# Patient Record
Sex: FEMALE | Race: WHITE | NOT HISPANIC OR LATINO | Employment: FULL TIME | URBAN - METROPOLITAN AREA
[De-identification: names, ages, dates, MRNs, and addresses within clinical notes are randomized per-mention and may not be internally consistent; named-entity substitution may affect disease eponyms.]

---

## 2017-12-06 DIAGNOSIS — M81.0 OSTEOPOROSIS: ICD-10-CM

## 2017-12-06 RX ORDER — RALOXIFENE HYDROCHLORIDE 60 MG/1
60 TABLET, FILM COATED ORAL DAILY
Qty: 90 TABLET | Refills: 3 | OUTPATIENT
Start: 2017-12-06 | End: 2018-12-06

## 2017-12-08 ENCOUNTER — TELEPHONE (OUTPATIENT)
Dept: OBSTETRICS AND GYNECOLOGY | Facility: CLINIC | Age: 52
End: 2017-12-08

## 2017-12-08 NOTE — TELEPHONE ENCOUNTER
Dr. Singh patient calling abut her rx- her pharmacy told her to call our office and she doesn't know why.

## 2018-01-08 ENCOUNTER — OFFICE VISIT (OUTPATIENT)
Dept: OBSTETRICS AND GYNECOLOGY | Facility: CLINIC | Age: 53
End: 2018-01-08
Attending: OBSTETRICS & GYNECOLOGY
Payer: COMMERCIAL

## 2018-01-08 VITALS
DIASTOLIC BLOOD PRESSURE: 62 MMHG | WEIGHT: 130.63 LBS | HEIGHT: 67 IN | BODY MASS INDEX: 20.5 KG/M2 | SYSTOLIC BLOOD PRESSURE: 100 MMHG

## 2018-01-08 DIAGNOSIS — Z11.51 ENCOUNTER FOR SCREENING FOR HUMAN PAPILLOMAVIRUS (HPV): ICD-10-CM

## 2018-01-08 DIAGNOSIS — Z12.4 ENCOUNTER FOR SCREENING FOR CERVICAL CANCER: ICD-10-CM

## 2018-01-08 DIAGNOSIS — Z01.419 ENCOUNTER FOR GYNECOLOGICAL EXAMINATION: Primary | ICD-10-CM

## 2018-01-08 PROCEDURE — 87624 HPV HI-RISK TYP POOLED RSLT: CPT

## 2018-01-08 PROCEDURE — 99386 PREV VISIT NEW AGE 40-64: CPT | Mod: S$PBB,,, | Performed by: OBSTETRICS & GYNECOLOGY

## 2018-01-08 PROCEDURE — 99999 PR PBB SHADOW E&M-EST. PATIENT-LVL III: CPT | Mod: PBBFAC,,, | Performed by: OBSTETRICS & GYNECOLOGY

## 2018-01-08 PROCEDURE — 88175 CYTOPATH C/V AUTO FLUID REDO: CPT

## 2018-01-08 RX ORDER — RALOXIFENE HYDROCHLORIDE 60 MG/1
TABLET, FILM COATED ORAL
COMMUNITY
Start: 2016-01-12 | End: 2018-01-08

## 2018-01-08 RX ORDER — RALOXIFENE HYDROCHLORIDE 60 MG/1
60 TABLET, FILM COATED ORAL DAILY
Qty: 90 TABLET | Refills: 3 | Status: SHIPPED | OUTPATIENT
Start: 2018-01-08 | End: 2019-01-05 | Stop reason: SDUPTHER

## 2018-01-08 NOTE — PROGRESS NOTES
Subjective:       Patient ID: Radha Mario is a 52 y.o. female.    Chief Complaint:  Annual Exam (last annual 2015, normal Pap, mmg 2017 at )      History of Present Illness  - here for annual. No complaints.    Past Medical History:   Diagnosis Date    Anxiety     Breast cancer        Past Surgical History:   Procedure Laterality Date    breast augmentation      BREAST LUMPECTOMY      then radiation         Current Outpatient Prescriptions:     RALOXIFENE HCL (EVISTA ORAL), once a day, Disp: , Rfl:     raloxifene (EVISTA) 60 mg tablet, Take 1 tablet (60 mg total) by mouth once daily., Disp: 90 tablet, Rfl: 3    Review of patient's allergies indicates:  No Known Allergies    GYN & OB History  No LMP recorded. Patient is postmenopausal.   Date of Last Pap: No result found    OB History    Para Term  AB Living   2 2 2     1   SAB TAB Ectopic Multiple Live Births           1      # Outcome Date GA Lbr Warren/2nd Weight Sex Delivery Anes PTL Lv   2 Term  40w0d  3.232 kg (7 lb 2 oz) F Vag-Spont EPI  MAAME   1 Term  40w0d  3.118 kg (6 lb 14 oz) F Vag-Spont EPI            Social History     Social History    Marital status:      Spouse name: N/A    Number of children: N/A    Years of education: N/A     Occupational History    Not on file.     Social History Main Topics    Smoking status: Current Every Day Smoker    Smokeless tobacco: Never Used    Alcohol use Yes      Comment: socailly     Drug use: No    Sexual activity: Yes     Partners: Male     Birth control/ protection: None     Other Topics Concern    Not on file     Social History Narrative    No narrative on file       Family History   Problem Relation Age of Onset    Stroke Father     Hypertension Mother     Breast cancer Neg Hx     Colon cancer Neg Hx     Diabetes Neg Hx     Ovarian cancer Neg Hx        Review of Systems  Review of Systems   Respiratory: Negative for shortness of breath.    Cardiovascular:  "Negative for chest pain and palpitations.   Gastrointestinal: Negative for blood in stool, nausea and vomiting.   Genitourinary:        - see HPI   Skin: Negative for rash and wound.   Allergic/Immunologic: Negative for immunocompromised state.   Neurological: Negative for dizziness and syncope.   Hematological: Negative for adenopathy.   Psychiatric/Behavioral: Negative for behavioral problems.        Objective:     Vitals:    01/08/18 1040   BP: 100/62   Weight: 59.2 kg (130 lb 10 oz)   Height: 5' 7" (1.702 m)       Physical Exam:   Constitutional: She is oriented to person, place, and time. She appears well-developed and well-nourished.        Pulmonary/Chest: Right breast exhibits no mass, no nipple discharge, no skin change, no tenderness and no swelling. Left breast exhibits no mass, no nipple discharge, no skin change, no tenderness and no swelling. Breasts are symmetrical.   Bilateral implants        Abdominal: Soft. She exhibits no distension. There is no tenderness.     Genitourinary: Vagina normal and uterus normal. There is no tenderness or lesion on the right labia. There is no tenderness or lesion on the left labia. Cervix is normal. Right adnexum displays no mass, no tenderness and no fullness. Left adnexum displays no mass, no tenderness and no fullness. No vaginal discharge found. Additional cervical findings: pap smear done          Musculoskeletal: Moves all extremeties.       Neurological: She is alert and oriented to person, place, and time.     Psychiatric: She has a normal mood and affect.        Assessment/ Plan:     Orders Placed This Encounter    HPV High Risk Genotypes, PCR    Liquid-based pap smear, screening    raloxifene (EVISTA) 60 mg tablet       Radha CRANDALL was seen today for annual exam.    Diagnoses and all orders for this visit:    Encounter for gynecological examination    Encounter for screening for human papillomavirus (HPV)  -     HPV High Risk Genotypes, PCR    Encounter for " screening for cervical cancer   -     Liquid-based pap smear, screening    Other orders  -     raloxifene (EVISTA) 60 mg tablet; Take 1 tablet (60 mg total) by mouth once daily.        Return in about 1 year (around 1/8/2019) for annual exam.

## 2018-01-12 ENCOUNTER — PATIENT MESSAGE (OUTPATIENT)
Dept: OBSTETRICS AND GYNECOLOGY | Facility: CLINIC | Age: 53
End: 2018-01-12

## 2018-01-12 LAB
HPV16 AG SPEC QL: NEGATIVE
HPV16+18+H RISK 12 DNA CVX-IMP: POSITIVE
HPV18 DNA SPEC QL NAA+PROBE: NEGATIVE

## 2019-01-07 RX ORDER — RALOXIFENE HYDROCHLORIDE 60 MG/1
TABLET, FILM COATED ORAL
Qty: 90 TABLET | Refills: 0 | Status: SHIPPED | OUTPATIENT
Start: 2019-01-07 | End: 2019-03-18 | Stop reason: SDUPTHER

## 2019-03-18 RX ORDER — RALOXIFENE HYDROCHLORIDE 60 MG/1
TABLET, FILM COATED ORAL
Qty: 90 TABLET | Refills: 0 | Status: SHIPPED | OUTPATIENT
Start: 2019-03-18 | End: 2019-04-26 | Stop reason: SDUPTHER

## 2019-04-26 ENCOUNTER — OFFICE VISIT (OUTPATIENT)
Dept: OBSTETRICS AND GYNECOLOGY | Facility: CLINIC | Age: 54
End: 2019-04-26
Payer: COMMERCIAL

## 2019-04-26 VITALS
DIASTOLIC BLOOD PRESSURE: 68 MMHG | HEIGHT: 67 IN | WEIGHT: 133.19 LBS | SYSTOLIC BLOOD PRESSURE: 104 MMHG | BODY MASS INDEX: 20.9 KG/M2

## 2019-04-26 DIAGNOSIS — M81.0 OSTEOPOROSIS OF LUMBAR SPINE: ICD-10-CM

## 2019-04-26 DIAGNOSIS — Z01.419 ENCOUNTER FOR GYNECOLOGICAL EXAMINATION: Primary | ICD-10-CM

## 2019-04-26 PROCEDURE — 99999 PR PBB SHADOW E&M-EST. PATIENT-LVL III: ICD-10-PCS | Mod: PBBFAC,,, | Performed by: OBSTETRICS & GYNECOLOGY

## 2019-04-26 PROCEDURE — 99396 PREV VISIT EST AGE 40-64: CPT | Mod: S$GLB,,, | Performed by: OBSTETRICS & GYNECOLOGY

## 2019-04-26 PROCEDURE — 99396 PR PREVENTIVE VISIT,EST,40-64: ICD-10-PCS | Mod: S$GLB,,, | Performed by: OBSTETRICS & GYNECOLOGY

## 2019-04-26 PROCEDURE — 99999 PR PBB SHADOW E&M-EST. PATIENT-LVL III: CPT | Mod: PBBFAC,,, | Performed by: OBSTETRICS & GYNECOLOGY

## 2019-04-26 RX ORDER — VALACYCLOVIR HYDROCHLORIDE 500 MG/1
500 TABLET, FILM COATED ORAL 2 TIMES DAILY
Qty: 30 TABLET | Refills: 11 | Status: SHIPPED | OUTPATIENT
Start: 2019-04-26 | End: 2021-05-13 | Stop reason: SDUPTHER

## 2019-04-26 RX ORDER — RALOXIFENE HYDROCHLORIDE 60 MG/1
60 TABLET, FILM COATED ORAL DAILY
Qty: 90 TABLET | Refills: 3 | Status: SHIPPED | OUTPATIENT
Start: 2019-04-26 | End: 2020-06-15 | Stop reason: SDUPTHER

## 2019-04-26 NOTE — PROGRESS NOTES
Subjective:       Patient ID: Radha Mario is a 53 y.o. female.    Chief Complaint:  Well Woman (pap nl/hpv- 2018 mammo followed by Dr Ritter )      History of Present Illness  - here for annual. No complaints. Hasn't had BMD since . Taking Evista with no problems.  - has a fever blisters; requests Valtrex.    Past Medical History:   Diagnosis Date    Anxiety     Breast cancer        Past Surgical History:   Procedure Laterality Date    breast augmentation      BREAST LUMPECTOMY      then radiation         Current Outpatient Medications:     raloxifene (EVISTA) 60 mg tablet, Take 1 tablet (60 mg total) by mouth once daily., Disp: 90 tablet, Rfl: 3    valACYclovir (VALTREX) 500 MG tablet, Take 1 tablet (500 mg total) by mouth 2 (two) times daily. For 3 days as needed for cold sores., Disp: 30 tablet, Rfl: 11    Review of patient's allergies indicates:  No Known Allergies    GYN & OB History  No LMP recorded. Patient is postmenopausal.   Date of Last Pap: 2018    OB History    Para Term  AB Living   2 2 2     1   SAB TAB Ectopic Multiple Live Births           1      # Outcome Date GA Lbr Warren/2nd Weight Sex Delivery Anes PTL Lv   2 Term  40w0d  3.232 kg (7 lb 2 oz) F Vag-Spont EPI  MAAME   1 Term  40w0d  3.118 kg (6 lb 14 oz) F Vag-Spont EPI         Social History     Socioeconomic History    Marital status:      Spouse name: Not on file    Number of children: Not on file    Years of education: Not on file    Highest education level: Not on file   Occupational History    Not on file   Social Needs    Financial resource strain: Not on file    Food insecurity:     Worry: Not on file     Inability: Not on file    Transportation needs:     Medical: Not on file     Non-medical: Not on file   Tobacco Use    Smoking status: Current Every Day Smoker    Smokeless tobacco: Never Used   Substance and Sexual Activity    Alcohol use: Yes     Comment: socailly     Drug use:  "No    Sexual activity: Yes     Partners: Male     Birth control/protection: None, Post-menopausal   Lifestyle    Physical activity:     Days per week: Not on file     Minutes per session: Not on file    Stress: Not on file   Relationships    Social connections:     Talks on phone: Not on file     Gets together: Not on file     Attends Sikhism service: Not on file     Active member of club or organization: Not on file     Attends meetings of clubs or organizations: Not on file     Relationship status: Not on file   Other Topics Concern    Not on file   Social History Narrative    Not on file       Family History   Problem Relation Age of Onset    Stroke Father     Hypertension Mother     Breast cancer Neg Hx     Colon cancer Neg Hx     Diabetes Neg Hx     Ovarian cancer Neg Hx        Review of Systems  Review of Systems   Respiratory: Negative for shortness of breath.    Cardiovascular: Negative for chest pain and palpitations.   Gastrointestinal: Negative for blood in stool, nausea and vomiting.   Genitourinary:        - see HPI   Skin: Negative for rash and wound.   Allergic/Immunologic: Negative for immunocompromised state.   Neurological: Negative for dizziness and syncope.   Hematological: Negative for adenopathy.   Psychiatric/Behavioral: Negative for behavioral problems.        Objective:     Vitals:    04/26/19 1127   BP: 104/68   Weight: 60.4 kg (133 lb 2.5 oz)   Height: 5' 7" (1.702 m)       Physical Exam:   Constitutional: She is oriented to person, place, and time. She appears well-developed and well-nourished.        Pulmonary/Chest: Right breast exhibits no mass, no nipple discharge, no skin change, no tenderness and no swelling. Left breast exhibits no mass, no nipple discharge, no skin change, no tenderness and no swelling. Breasts are symmetrical.   Bilateral implants        Abdominal: Soft. She exhibits no distension. There is no tenderness.     Genitourinary: Vagina normal and uterus " normal. There is no tenderness or lesion on the right labia. There is no tenderness or lesion on the left labia. Cervix is normal. Right adnexum displays no mass, no tenderness and no fullness. Left adnexum displays no mass, no tenderness and no fullness. No vaginal discharge found.           Musculoskeletal: Moves all extremeties.       Neurological: She is alert and oriented to person, place, and time.     Psychiatric: She has a normal mood and affect.        Assessment/ Plan:     Orders Placed This Encounter    DXA Bone Density Spine And Hip    valACYclovir (VALTREX) 500 MG tablet    raloxifene (EVISTA) 60 mg tablet       Radha CRANDALL was seen today for well woman.    Diagnoses and all orders for this visit:    Encounter for gynecological examination    Osteoporosis of lumbar spine  -     DXA Bone Density Spine And Hip; Future    Other orders  -     valACYclovir (VALTREX) 500 MG tablet; Take 1 tablet (500 mg total) by mouth 2 (two) times daily. For 3 days as needed for cold sores.  -     raloxifene (EVISTA) 60 mg tablet; Take 1 tablet (60 mg total) by mouth once daily.    - needs a PCP. Discussed her calling to make an appt with Dr. Luna.    Follow up in about 1 year (around 4/26/2020) for annual exam.

## 2019-05-20 ENCOUNTER — APPOINTMENT (OUTPATIENT)
Dept: RADIOLOGY | Facility: CLINIC | Age: 54
End: 2019-05-20
Attending: OBSTETRICS & GYNECOLOGY
Payer: COMMERCIAL

## 2019-05-20 DIAGNOSIS — M81.0 OSTEOPOROSIS OF LUMBAR SPINE: ICD-10-CM

## 2019-05-20 PROCEDURE — 77080 DXA BONE DENSITY AXIAL: CPT | Mod: TC,PO

## 2019-05-20 PROCEDURE — 77080 DXA BONE DENSITY AXIAL: CPT | Mod: 26,,, | Performed by: INTERNAL MEDICINE

## 2019-05-20 PROCEDURE — 77080 DEXA BONE DENSITY SPINE HIP: ICD-10-PCS | Mod: 26,,, | Performed by: INTERNAL MEDICINE

## 2019-05-23 ENCOUNTER — PATIENT MESSAGE (OUTPATIENT)
Dept: OBSTETRICS AND GYNECOLOGY | Facility: CLINIC | Age: 54
End: 2019-05-23

## 2020-06-15 RX ORDER — RALOXIFENE HYDROCHLORIDE 60 MG/1
60 TABLET, FILM COATED ORAL DAILY
Qty: 90 TABLET | Refills: 0 | Status: SHIPPED | OUTPATIENT
Start: 2020-06-15 | End: 2020-09-08

## 2020-06-15 NOTE — TELEPHONE ENCOUNTER
Sybil Vaz S Staff 45 minutes ago (8:54 AM)     Pharm faxed request    Routing comment       [id number] - [place] - [address] [place] [phone]  Sybil Monique 46 minutes ago (8:53 AM)     Pt last annual was 4/26/19 with no current appt scheduled please review

## 2020-09-08 ENCOUNTER — OFFICE VISIT (OUTPATIENT)
Dept: OBSTETRICS AND GYNECOLOGY | Facility: CLINIC | Age: 55
End: 2020-09-08
Payer: COMMERCIAL

## 2020-09-08 VITALS — BODY MASS INDEX: 20.86 KG/M2 | HEIGHT: 67 IN

## 2020-09-08 DIAGNOSIS — Z01.419 WOMEN'S ANNUAL ROUTINE GYNECOLOGICAL EXAMINATION: Primary | ICD-10-CM

## 2020-09-08 DIAGNOSIS — Z76.89 ENCOUNTER TO ESTABLISH CARE: ICD-10-CM

## 2020-09-08 DIAGNOSIS — M81.0 OSTEOPOROSIS OF LUMBAR SPINE: ICD-10-CM

## 2020-09-08 DIAGNOSIS — F17.200 NICOTINE DEPENDENCE WITH CURRENT USE: ICD-10-CM

## 2020-09-08 DIAGNOSIS — Z12.4 PAP SMEAR FOR CERVICAL CANCER SCREENING: ICD-10-CM

## 2020-09-08 PROCEDURE — 88141 PR  CYTOPATH CERV/VAG INTERPRET: ICD-10-PCS | Mod: ,,, | Performed by: PATHOLOGY

## 2020-09-08 PROCEDURE — 99396 PREV VISIT EST AGE 40-64: CPT | Mod: S$GLB,,, | Performed by: NURSE PRACTITIONER

## 2020-09-08 PROCEDURE — 99999 PR PBB SHADOW E&M-EST. PATIENT-LVL III: ICD-10-PCS | Mod: PBBFAC,,, | Performed by: NURSE PRACTITIONER

## 2020-09-08 PROCEDURE — 3008F PR BODY MASS INDEX (BMI) DOCUMENTED: ICD-10-PCS | Mod: CPTII,S$GLB,, | Performed by: NURSE PRACTITIONER

## 2020-09-08 PROCEDURE — 3008F BODY MASS INDEX DOCD: CPT | Mod: CPTII,S$GLB,, | Performed by: NURSE PRACTITIONER

## 2020-09-08 PROCEDURE — 87624 HPV HI-RISK TYP POOLED RSLT: CPT

## 2020-09-08 PROCEDURE — 99999 PR PBB SHADOW E&M-EST. PATIENT-LVL III: CPT | Mod: PBBFAC,,, | Performed by: NURSE PRACTITIONER

## 2020-09-08 PROCEDURE — 88141 CYTOPATH C/V INTERPRET: CPT | Mod: ,,, | Performed by: PATHOLOGY

## 2020-09-08 PROCEDURE — 88175 CYTOPATH C/V AUTO FLUID REDO: CPT | Performed by: PATHOLOGY

## 2020-09-08 PROCEDURE — 99396 PR PREVENTIVE VISIT,EST,40-64: ICD-10-PCS | Mod: S$GLB,,, | Performed by: NURSE PRACTITIONER

## 2020-09-08 RX ORDER — CETIRIZINE HYDROCHLORIDE 10 MG/1
10 TABLET ORAL DAILY
COMMUNITY
End: 2022-12-27

## 2020-09-08 NOTE — PROGRESS NOTES
CC: Annual  HPI: Pt is a 54 y.o.  female who presents for routine annual exam. She uses nothin for contraception, . She does not want STD screening.  Denies any GYN complaints.  The patient participates in regular exercise: No.  The patient does smoke, 10-20 cigarettes/day.  The patient wears seatbelts.   Pt denies any domestic violence.    History of breast CA with lumpectomy and radiation. Recent mammo at [facility], awaiting results. Last pap in 2018, HPV+. Reports no history of colonoscopy. Patient is not established with PCP. T score: -3.5 at lumbar spine, slight z-score elevation, compliant with Evista.      FH:  Breast cancer: none  Colon cancer: none  Ovarian cancer: none  Endometrial cancer: none    ROS:  GENERAL: Feeling well overall. Denies fever or chills.   SKIN: Denies rash or lesions.   HEAD: Denies head injury or headache.   NODES: Denies enlarged lymph nodes.   CHEST: Denies chest pain or shortness of breath.   CARDIOVASCULAR: Denies palpitations or left sided chest pain.   ABDOMEN: No abdominal pain, constipation, diarrhea, nausea, vomiting or rectal bleeding.   URINARY: No dysuria, hematuria, or burning on urination.  REPRODUCTIVE: See HPI.   BREASTS: Denies pain, lumps, or nipple discharge.   HEMATOLOGIC: No easy bruisability or excessive bleeding.   MUSCULOSKELETAL: Denies joint pain or swelling.   NEUROLOGIC: Denies syncope or weakness.   PSYCHIATRIC: Denies depression, anxiety or mood swings.    PE:   APPEARANCE: Well nourished, well developed, White female in no acute distress.  NODES: no cervical, supraclavicular, or inguinal lymphadenopathy  BREASTS: Symmetrical, no skin changes or visible lesions. No palpable masses, nipple discharge or adenopathy bilaterally. Breast implants bilaterally.  ABDOMEN: Soft. No tenderness or masses. No distention. No hernias palpated. No CVA tenderness.  VULVA: No lesions. Normal external female genitalia.  URETHRAL MEATUS: Normal size and location, no lesions, no  prolapse.  URETHRA: No masses, tenderness, or prolapse.  VAGINA: Moist. No lesions or lacerations noted. No abnormal discharge present. No odor present.   CERVIX: No lesions or discharge. No cervical motion tenderness.   UTERUS: Normal size, regular shape, mobile, non-tender.  ADNEXA: No tenderness. No fullness or masses palpated in the adnexal regions.   ANUS PERINEUM: Normal.      Diagnosis:  1. Women's annual routine gynecological examination    2. Encounter to establish care    3. Pap smear for cervical cancer screening    4. Nicotine dependence with current use    5. Osteoporosis of lumbar spine        Plan:     Orders Placed This Encounter    HPV High Risk Genotypes, PCR    Ambulatory referral/consult to Internal Medicine    Liquid-Based Pap Smear, Screening     HPV/Pap    Will repeat bone density in 1 year. Calcium/vitamin D supplementation, exercise endorsed. Will continue Evista.     Discussed smoking cessation, declines referral to smoking cessation problem. Would like to discuss Wellbutrin. Will refer to PCP to establish care and further annual testings including colonoscopy.     Patient was counseled today on the new ACS guidelines for cervical cytology screening as well as the current recommendations for breast cancer screening. She was counseled to follow up with her PCP for other routine health maintenance. Counseling session lasted approximately 10 minutes, and all her questions were answered.    Follow-up with me in 1 year for routine exam.      JAYLENE Coyle

## 2020-09-14 LAB
HPV HR 12 DNA SPEC QL NAA+PROBE: POSITIVE
HPV16 AG SPEC QL: NEGATIVE
HPV18 DNA SPEC QL NAA+PROBE: NEGATIVE

## 2020-09-25 ENCOUNTER — PATIENT MESSAGE (OUTPATIENT)
Dept: OBSTETRICS AND GYNECOLOGY | Facility: CLINIC | Age: 55
End: 2020-09-25

## 2020-09-25 LAB
FINAL PATHOLOGIC DIAGNOSIS: ABNORMAL
Lab: ABNORMAL

## 2020-09-28 ENCOUNTER — TELEPHONE (OUTPATIENT)
Dept: OBSTETRICS AND GYNECOLOGY | Facility: CLINIC | Age: 55
End: 2020-09-28

## 2020-09-28 NOTE — TELEPHONE ENCOUNTER
Татьяна Motta MD  You 3 days ago     Spoke with patient. Explained need for colpo. Discussed nature of HPV.  Patient verbalized understanding. Please call patient first thing Monday morning and fit her in for colpo.    Message text      Spoke with pt and scheduled her for 9/29 at 10:30 at the [facility] location with Dr. Motta.

## 2020-09-29 ENCOUNTER — PROCEDURE VISIT (OUTPATIENT)
Dept: OBSTETRICS AND GYNECOLOGY | Facility: CLINIC | Age: 55
End: 2020-09-29
Attending: OBSTETRICS & GYNECOLOGY
Payer: COMMERCIAL

## 2020-09-29 VITALS
HEIGHT: 67 IN | BODY MASS INDEX: 21.31 KG/M2 | DIASTOLIC BLOOD PRESSURE: 80 MMHG | WEIGHT: 135.81 LBS | SYSTOLIC BLOOD PRESSURE: 110 MMHG

## 2020-09-29 DIAGNOSIS — R87.613 HGSIL (HIGH GRADE SQUAMOUS INTRAEPITHELIAL LESION) ON PAP SMEAR OF CERVIX: Primary | ICD-10-CM

## 2020-09-29 LAB
B-HCG UR QL: NEGATIVE
CTP QC/QA: YES

## 2020-09-29 PROCEDURE — 88305 TISSUE EXAM BY PATHOLOGIST: CPT | Performed by: PATHOLOGY

## 2020-09-29 PROCEDURE — 57454 BX/CURETT OF CERVIX W/SCOPE: CPT | Mod: S$GLB,,, | Performed by: OBSTETRICS & GYNECOLOGY

## 2020-09-29 PROCEDURE — 88305 TISSUE EXAM BY PATHOLOGIST: CPT | Mod: 26,,, | Performed by: PATHOLOGY

## 2020-09-29 PROCEDURE — 88305 TISSUE EXAM BY PATHOLOGIST: ICD-10-PCS | Mod: 26,,, | Performed by: PATHOLOGY

## 2020-09-29 PROCEDURE — 57454 COLPOSCOPY: ICD-10-PCS | Mod: S$GLB,,, | Performed by: OBSTETRICS & GYNECOLOGY

## 2020-09-29 NOTE — PROCEDURES
Colposcopy    Date/Time: 9/29/2020 10:30 AM  Performed by: Татьяна Motta MD  Authorized by: Татьяна Motta MD     Consent Done?:  Yes (Written)  Timeout:Immediately prior to procedure a time out was called to verify the correct patient, procedure, equipment, support staff and site/side marked as required    Colposcopy Site:  Cervix  Position:  Supine  Acrowhite Lesion? Yes    Atypical Vessels: No    Transformation Zone Adequate?: Yes    Biopsy?: Yes         Location:  Cervix ((6 00 and 2 00))  ECC Performed?: Yes    LEEP Performed?: No    Estimated blood loss (cc):  10   Patient tolerated the procedure well with no immediate complications.   Post-operative instructions were provided for the patient.   Patient was discharged and will follow up if any complications occur

## 2020-10-02 LAB
FINAL PATHOLOGIC DIAGNOSIS: NORMAL
GROSS: NORMAL

## 2021-04-13 ENCOUNTER — HOSPITAL ENCOUNTER (OUTPATIENT)
Dept: RADIOLOGY | Facility: OTHER | Age: 56
Discharge: HOME | End: 2021-04-13
Attending: INTERNAL MEDICINE
Payer: COMMERCIAL

## 2021-04-13 DIAGNOSIS — I25.10 ATHEROSCLEROSIS OF NATIVE CORONARY ARTERY OF NATIVE HEART WITHOUT ANGINA PECTORIS: ICD-10-CM

## 2021-04-13 PROCEDURE — 75571 CT HRT W/O DYE W/CA TEST: CPT | Mod: 26,,, | Performed by: RADIOLOGY

## 2021-04-13 PROCEDURE — 75571 CT HRT W/O DYE W/CA TEST: CPT | Mod: TC

## 2021-04-13 PROCEDURE — 75571 CT CALCIUM SCORING CARDIAC: ICD-10-PCS | Mod: 26,,, | Performed by: RADIOLOGY

## 2021-04-15 ENCOUNTER — PATIENT MESSAGE (OUTPATIENT)
Dept: RESEARCH | Facility: HOSPITAL | Age: 56
End: 2021-04-15

## 2021-06-18 PROBLEM — M81.0 AGE-RELATED OSTEOPOROSIS WITHOUT CURRENT PATHOLOGICAL FRACTURE: Status: ACTIVE | Noted: 2021-06-18

## 2021-06-18 PROBLEM — Z85.3 HISTORY OF BREAST CANCER: Status: ACTIVE | Noted: 2021-06-18

## 2021-09-21 ENCOUNTER — HOSPITAL ENCOUNTER (OUTPATIENT)
Dept: RADIOLOGY | Facility: OTHER | Age: 56
Discharge: HOME | End: 2021-09-21
Attending: INTERNAL MEDICINE
Payer: COMMERCIAL

## 2021-09-21 DIAGNOSIS — Z12.2 ENCOUNTER FOR SCREENING FOR MALIGNANT NEOPLASM OF RESPIRATORY ORGANS: ICD-10-CM

## 2021-09-21 DIAGNOSIS — M81.0 OSTEOPOROSIS, UNSPECIFIED OSTEOPOROSIS TYPE, UNSPECIFIED PATHOLOGICAL FRACTURE PRESENCE: ICD-10-CM

## 2021-09-21 DIAGNOSIS — R91.1 SOLITARY PULMONARY NODULE: ICD-10-CM

## 2021-09-21 PROCEDURE — 77080 DXA BONE DENSITY AXIAL: CPT | Mod: TC

## 2021-09-21 PROCEDURE — 71271 CT THORAX LUNG CANCER SCR C-: CPT | Mod: TC

## 2021-09-21 PROCEDURE — 77080 DXA BONE DENSITY AXIAL: CPT | Mod: 26,,, | Performed by: RADIOLOGY

## 2021-09-21 PROCEDURE — 71271 CT CHEST LUNG SCREENING LOW DOSE: ICD-10-PCS | Mod: 26,,, | Performed by: RADIOLOGY

## 2021-09-21 PROCEDURE — 71271 CT THORAX LUNG CANCER SCR C-: CPT | Mod: 26,,, | Performed by: RADIOLOGY

## 2021-09-21 PROCEDURE — 77080 DEXA BONE DENSITY SPINE HIP: ICD-10-PCS | Mod: 26,,, | Performed by: RADIOLOGY

## 2021-10-15 ENCOUNTER — PATIENT MESSAGE (OUTPATIENT)
Dept: OBSTETRICS AND GYNECOLOGY | Facility: CLINIC | Age: 56
End: 2021-10-15
Payer: COMMERCIAL

## 2021-10-25 ENCOUNTER — TELEPHONE (OUTPATIENT)
Dept: OBSTETRICS AND GYNECOLOGY | Facility: CLINIC | Age: 56
End: 2021-10-25
Payer: COMMERCIAL

## 2021-10-25 DIAGNOSIS — Z12.31 OTHER SCREENING MAMMOGRAM: Primary | ICD-10-CM

## 2021-11-17 ENCOUNTER — OFFICE VISIT (OUTPATIENT)
Dept: OBSTETRICS AND GYNECOLOGY | Facility: CLINIC | Age: 56
End: 2021-11-17
Payer: COMMERCIAL

## 2021-11-17 VITALS
HEIGHT: 67 IN | WEIGHT: 136.81 LBS | DIASTOLIC BLOOD PRESSURE: 60 MMHG | SYSTOLIC BLOOD PRESSURE: 110 MMHG | BODY MASS INDEX: 21.47 KG/M2

## 2021-11-17 DIAGNOSIS — Z12.4 PAP SMEAR FOR CERVICAL CANCER SCREENING: ICD-10-CM

## 2021-11-17 DIAGNOSIS — Z11.51 ENCOUNTER FOR SCREENING FOR HUMAN PAPILLOMAVIRUS (HPV): ICD-10-CM

## 2021-11-17 DIAGNOSIS — R87.613 HGSIL (HIGH GRADE SQUAMOUS INTRAEPITHELIAL LESION) ON PAP SMEAR OF CERVIX: Primary | ICD-10-CM

## 2021-11-17 PROCEDURE — 87624 HPV HI-RISK TYP POOLED RSLT: CPT | Performed by: NURSE PRACTITIONER

## 2021-11-17 PROCEDURE — 3008F BODY MASS INDEX DOCD: CPT | Mod: CPTII,S$GLB,, | Performed by: NURSE PRACTITIONER

## 2021-11-17 PROCEDURE — 3078F PR MOST RECENT DIASTOLIC BLOOD PRESSURE < 80 MM HG: ICD-10-PCS | Mod: CPTII,S$GLB,, | Performed by: NURSE PRACTITIONER

## 2021-11-17 PROCEDURE — 99999 PR PBB SHADOW E&M-EST. PATIENT-LVL III: ICD-10-PCS | Mod: PBBFAC,,, | Performed by: NURSE PRACTITIONER

## 2021-11-17 PROCEDURE — 1159F MED LIST DOCD IN RCRD: CPT | Mod: CPTII,S$GLB,, | Performed by: NURSE PRACTITIONER

## 2021-11-17 PROCEDURE — 3078F DIAST BP <80 MM HG: CPT | Mod: CPTII,S$GLB,, | Performed by: NURSE PRACTITIONER

## 2021-11-17 PROCEDURE — 1159F PR MEDICATION LIST DOCUMENTED IN MEDICAL RECORD: ICD-10-PCS | Mod: CPTII,S$GLB,, | Performed by: NURSE PRACTITIONER

## 2021-11-17 PROCEDURE — 99396 PR PREVENTIVE VISIT,EST,40-64: ICD-10-PCS | Mod: S$GLB,,, | Performed by: NURSE PRACTITIONER

## 2021-11-17 PROCEDURE — 99396 PREV VISIT EST AGE 40-64: CPT | Mod: S$GLB,,, | Performed by: NURSE PRACTITIONER

## 2021-11-17 PROCEDURE — 3008F PR BODY MASS INDEX (BMI) DOCUMENTED: ICD-10-PCS | Mod: CPTII,S$GLB,, | Performed by: NURSE PRACTITIONER

## 2021-11-17 PROCEDURE — 3074F PR MOST RECENT SYSTOLIC BLOOD PRESSURE < 130 MM HG: ICD-10-PCS | Mod: CPTII,S$GLB,, | Performed by: NURSE PRACTITIONER

## 2021-11-17 PROCEDURE — 1160F RVW MEDS BY RX/DR IN RCRD: CPT | Mod: CPTII,S$GLB,, | Performed by: NURSE PRACTITIONER

## 2021-11-17 PROCEDURE — 1160F PR REVIEW ALL MEDS BY PRESCRIBER/CLIN PHARMACIST DOCUMENTED: ICD-10-PCS | Mod: CPTII,S$GLB,, | Performed by: NURSE PRACTITIONER

## 2021-11-17 PROCEDURE — 3074F SYST BP LT 130 MM HG: CPT | Mod: CPTII,S$GLB,, | Performed by: NURSE PRACTITIONER

## 2021-11-17 PROCEDURE — 99999 PR PBB SHADOW E&M-EST. PATIENT-LVL III: CPT | Mod: PBBFAC,,, | Performed by: NURSE PRACTITIONER

## 2021-11-24 ENCOUNTER — PATIENT MESSAGE (OUTPATIENT)
Dept: OBSTETRICS AND GYNECOLOGY | Facility: CLINIC | Age: 56
End: 2021-11-24
Payer: COMMERCIAL

## 2021-11-24 LAB
CYTOLOGIST CVX/VAG CYTO: ABNORMAL
CYTOLOGY CVX/VAG DOC CYTO: ABNORMAL
CYTOLOGY CVX/VAG DOC THIN PREP: ABNORMAL
CYTOLOGY THINPREP PAP COMMENT: ABNORMAL
HPV HR 12 DNA CVX QL NAA+PROBE: POSITIVE
HPV16 DNA CVX QL NAA+PROBE: NEGATIVE
HPV18 DNA CVX QL NAA+PROBE: NEGATIVE
PAP NOTE: ABNORMAL
STAT OF ADQ CVX/VAG CYTO-IMP: ABNORMAL

## 2021-12-01 ENCOUNTER — TELEPHONE (OUTPATIENT)
Dept: OBSTETRICS AND GYNECOLOGY | Facility: CLINIC | Age: 56
End: 2021-12-01
Payer: COMMERCIAL

## 2021-12-03 ENCOUNTER — TELEPHONE (OUTPATIENT)
Dept: OBSTETRICS AND GYNECOLOGY | Facility: CLINIC | Age: 56
End: 2021-12-03
Payer: COMMERCIAL

## 2022-12-02 ENCOUNTER — PATIENT MESSAGE (OUTPATIENT)
Dept: OBSTETRICS AND GYNECOLOGY | Facility: CLINIC | Age: 57
End: 2022-12-02
Payer: COMMERCIAL

## 2022-12-02 DIAGNOSIS — Z12.31 BREAST CANCER SCREENING BY MAMMOGRAM: Primary | ICD-10-CM

## 2022-12-05 ENCOUNTER — HOSPITAL ENCOUNTER (OUTPATIENT)
Dept: RADIOLOGY | Facility: OTHER | Age: 57
Discharge: HOME | End: 2022-12-05
Attending: INTERNAL MEDICINE
Payer: COMMERCIAL

## 2022-12-05 DIAGNOSIS — Z12.2 SCREENING FOR LUNG CANCER: ICD-10-CM

## 2022-12-05 PROCEDURE — 71271 CT THORAX LUNG CANCER SCR C-: CPT | Mod: TC

## 2022-12-05 PROCEDURE — 71271 CT CHEST LUNG SCREENING LOW DOSE: ICD-10-PCS | Mod: 26,,, | Performed by: RADIOLOGY

## 2022-12-05 PROCEDURE — 71271 CT THORAX LUNG CANCER SCR C-: CPT | Mod: 26,,, | Performed by: RADIOLOGY

## 2022-12-13 ENCOUNTER — TELEPHONE (OUTPATIENT)
Dept: OBSTETRICS AND GYNECOLOGY | Facility: CLINIC | Age: 57
End: 2022-12-13
Payer: COMMERCIAL

## 2022-12-13 DIAGNOSIS — N63.0 MASS OF BREAST, UNSPECIFIED LATERALITY: Primary | ICD-10-CM

## 2023-01-09 PROBLEM — Z72.0 TOBACCO ABUSE: Status: ACTIVE | Noted: 2023-01-09

## 2023-03-06 ENCOUNTER — OFFICE VISIT (OUTPATIENT)
Dept: OBSTETRICS AND GYNECOLOGY | Facility: CLINIC | Age: 58
End: 2023-03-06
Attending: OBSTETRICS & GYNECOLOGY
Payer: COMMERCIAL

## 2023-03-06 VITALS
BODY MASS INDEX: 21.21 KG/M2 | SYSTOLIC BLOOD PRESSURE: 97 MMHG | HEIGHT: 67 IN | DIASTOLIC BLOOD PRESSURE: 62 MMHG | WEIGHT: 135.13 LBS

## 2023-03-06 DIAGNOSIS — Z01.419 ENCOUNTER FOR GYNECOLOGICAL EXAMINATION: Primary | ICD-10-CM

## 2023-03-06 DIAGNOSIS — Z12.4 SCREENING FOR CERVICAL CANCER: ICD-10-CM

## 2023-03-06 DIAGNOSIS — Z11.51 SCREENING FOR HUMAN PAPILLOMAVIRUS (HPV): ICD-10-CM

## 2023-03-06 DIAGNOSIS — Z85.3 PERSONAL HISTORY OF BREAST CANCER: ICD-10-CM

## 2023-03-06 PROCEDURE — 99999 PR PBB SHADOW E&M-EST. PATIENT-LVL III: CPT | Mod: PBBFAC,,, | Performed by: OBSTETRICS & GYNECOLOGY

## 2023-03-06 PROCEDURE — 88141 CYTOPATH C/V INTERPRET: CPT | Mod: ,,, | Performed by: PATHOLOGY

## 2023-03-06 PROCEDURE — 1159F PR MEDICATION LIST DOCUMENTED IN MEDICAL RECORD: ICD-10-PCS | Mod: CPTII,S$GLB,, | Performed by: OBSTETRICS & GYNECOLOGY

## 2023-03-06 PROCEDURE — 3078F DIAST BP <80 MM HG: CPT | Mod: CPTII,S$GLB,, | Performed by: OBSTETRICS & GYNECOLOGY

## 2023-03-06 PROCEDURE — 88141 PR  CYTOPATH CERV/VAG INTERPRET: ICD-10-PCS | Mod: ,,, | Performed by: PATHOLOGY

## 2023-03-06 PROCEDURE — 99396 PR PREVENTIVE VISIT,EST,40-64: ICD-10-PCS | Mod: S$GLB,,, | Performed by: OBSTETRICS & GYNECOLOGY

## 2023-03-06 PROCEDURE — 1160F PR REVIEW ALL MEDS BY PRESCRIBER/CLIN PHARMACIST DOCUMENTED: ICD-10-PCS | Mod: CPTII,S$GLB,, | Performed by: OBSTETRICS & GYNECOLOGY

## 2023-03-06 PROCEDURE — 88175 CYTOPATH C/V AUTO FLUID REDO: CPT | Performed by: PATHOLOGY

## 2023-03-06 PROCEDURE — 1160F RVW MEDS BY RX/DR IN RCRD: CPT | Mod: CPTII,S$GLB,, | Performed by: OBSTETRICS & GYNECOLOGY

## 2023-03-06 PROCEDURE — 3074F PR MOST RECENT SYSTOLIC BLOOD PRESSURE < 130 MM HG: ICD-10-PCS | Mod: CPTII,S$GLB,, | Performed by: OBSTETRICS & GYNECOLOGY

## 2023-03-06 PROCEDURE — 3074F SYST BP LT 130 MM HG: CPT | Mod: CPTII,S$GLB,, | Performed by: OBSTETRICS & GYNECOLOGY

## 2023-03-06 PROCEDURE — 3008F BODY MASS INDEX DOCD: CPT | Mod: CPTII,S$GLB,, | Performed by: OBSTETRICS & GYNECOLOGY

## 2023-03-06 PROCEDURE — 99396 PREV VISIT EST AGE 40-64: CPT | Mod: S$GLB,,, | Performed by: OBSTETRICS & GYNECOLOGY

## 2023-03-06 PROCEDURE — 87624 HPV HI-RISK TYP POOLED RSLT: CPT | Performed by: OBSTETRICS & GYNECOLOGY

## 2023-03-06 PROCEDURE — 3078F PR MOST RECENT DIASTOLIC BLOOD PRESSURE < 80 MM HG: ICD-10-PCS | Mod: CPTII,S$GLB,, | Performed by: OBSTETRICS & GYNECOLOGY

## 2023-03-06 PROCEDURE — 3008F PR BODY MASS INDEX (BMI) DOCUMENTED: ICD-10-PCS | Mod: CPTII,S$GLB,, | Performed by: OBSTETRICS & GYNECOLOGY

## 2023-03-06 PROCEDURE — 99999 PR PBB SHADOW E&M-EST. PATIENT-LVL III: ICD-10-PCS | Mod: PBBFAC,,, | Performed by: OBSTETRICS & GYNECOLOGY

## 2023-03-06 PROCEDURE — 1159F MED LIST DOCD IN RCRD: CPT | Mod: CPTII,S$GLB,, | Performed by: OBSTETRICS & GYNECOLOGY

## 2023-03-06 NOTE — PROGRESS NOTES
"Subjective:       Patient ID: Radha Mario is a 57 y.o. female.    Chief Complaint:  Annual Exam (Last pap: 2021; Last hpv: 2021; Last mm2022)      History of Present Illness  - here for annual. No complaints.  - would like to explore genetic testing for breast cancer. Patient had lumpectomy and radiation at age 35. Her mother was recently diagnosed at age 82; her testing is pending.    Past Medical History:   Diagnosis Date    Abnormal Pap smear of cervix 2018    Hpv +      Anxiety     Breast cancer 2001    Left Lumpectomy     Menopause        Past Surgical History:   Procedure Laterality Date    AUGMENTATION OF BREAST      BREAST LUMPECTOMY      then radiation        Family History   Problem Relation Age of Onset    Stroke Father     Hypertension Mother     Breast cancer Mother     Colon cancer Neg Hx     Diabetes Neg Hx     Ovarian cancer Neg Hx         Social History     Socioeconomic History    Marital status:    Tobacco Use    Smoking status: Every Day    Smokeless tobacco: Never   Substance and Sexual Activity    Alcohol use: Yes     Comment: socailly     Drug use: No    Sexual activity: Yes     Partners: Male     Birth control/protection: None, Post-menopausal     Comment: :      [facility]             Objective:     Vitals:    23 1045   BP: 97/62   Weight: 61.3 kg (135 lb 2.3 oz)   Height: 5' 7" (1.702 m)       Physical Exam:   Constitutional: She is oriented to person, place, and time. She appears well-developed and well-nourished.        Pulmonary/Chest: Right breast exhibits augmentation. Right breast exhibits no mass, no nipple discharge, no skin change, no tenderness and no swelling. Left breast exhibits augmentation. Left breast exhibits no mass, no nipple discharge, no skin change, no tenderness and no swelling. Breasts are symmetrical.        Abdominal: Soft. She exhibits no distension. There is no abdominal tenderness.     Genitourinary:    " Vagina and uterus normal.   There is no tenderness or lesion on the right labia. There is no tenderness or lesion on the left labia. Cervix is normal. Right adnexum displays no mass, no tenderness and no fullness. Left adnexum displays no mass, no tenderness and no fullness. No  no vaginal discharge in the vagina.    pap smear completed          Musculoskeletal: Moves all extremeties.       Neurological: She is alert and oriented to person, place, and time.     Psychiatric: She has a normal mood and affect.      Assessment/ Plan:     Orders Placed This Encounter    HPV High Risk Genotypes, PCR    Ambulatory referral/consult to Genetics    Liquid-Based Pap Smear, Screening       Radha CRANDALL was seen today for annual exam.    Diagnoses and all orders for this visit:    Encounter for gynecological examination    Personal history of breast cancer  -     Ambulatory referral/consult to Genetics; Future    Screening for cervical cancer  -     Liquid-Based Pap Smear, Screening    Screening for human papillomavirus (HPV)  -     HPV High Risk Genotypes, PCR    - will refer to [employer] for counseling and testing if patient desires once she's been counseled.  - she'll let me know when her mother's tests are resulted.    Follow up in about 1 year (around 3/6/2024) for annual exam.    As of April 1, 2021, the Cures Act has been passed nationally. This new law requires that all doctors progress notes, lab results, pathology reports and radiology reports be released IMMEDIATELY to the patient in the patient portal. That means that the results are released to you at the EXACT same time they are released to me. Therefore, with all of the patients that I have I am not able to reply to each patient exactly when the results come in. So there will be a delay from when you see the results to when I see them and have time to come up with a response to send you. Also I only see these results when I am on the computer at work. So if the  results come in over the weekend or after 5 pm of a work day, I will not see them until the next business day. As you can tell, this is a challenge as a physician to give every patient the quick response they hope for and deserve. So please be patient!   Thanks for your understanding and patience.

## 2023-03-09 ENCOUNTER — TELEPHONE (OUTPATIENT)
Dept: HEMATOLOGY/ONCOLOGY | Facility: CLINIC | Age: 58
End: 2023-03-09
Payer: COMMERCIAL

## 2023-03-13 LAB
FINAL PATHOLOGIC DIAGNOSIS: ABNORMAL
Lab: ABNORMAL

## 2023-03-16 ENCOUNTER — PROCEDURE VISIT (OUTPATIENT)
Dept: OBSTETRICS AND GYNECOLOGY | Facility: CLINIC | Age: 58
End: 2023-03-16
Payer: COMMERCIAL

## 2023-03-16 VITALS
DIASTOLIC BLOOD PRESSURE: 68 MMHG | SYSTOLIC BLOOD PRESSURE: 102 MMHG | HEIGHT: 67 IN | WEIGHT: 134.5 LBS | BODY MASS INDEX: 21.11 KG/M2

## 2023-03-16 DIAGNOSIS — R87.618 PAP SMEAR ABNORMALITY OF CERVIX/HUMAN PAPILLOMAVIRUS (HPV) POSITIVE: ICD-10-CM

## 2023-03-16 DIAGNOSIS — R87.613 HGSIL (HIGH GRADE SQUAMOUS INTRAEPITHELIAL LESION) ON PAP SMEAR OF CERVIX: Primary | ICD-10-CM

## 2023-03-16 PROCEDURE — 57454 BX/CURETT OF CERVIX W/SCOPE: CPT | Mod: S$GLB,,, | Performed by: OBSTETRICS & GYNECOLOGY

## 2023-03-16 PROCEDURE — 88305 TISSUE EXAM BY PATHOLOGIST: ICD-10-PCS | Mod: 26,,, | Performed by: PATHOLOGY

## 2023-03-16 PROCEDURE — 88305 TISSUE EXAM BY PATHOLOGIST: CPT | Performed by: PATHOLOGY

## 2023-03-16 PROCEDURE — 57454 COLPOSCOPY: ICD-10-PCS | Mod: S$GLB,,, | Performed by: OBSTETRICS & GYNECOLOGY

## 2023-03-16 PROCEDURE — 88305 TISSUE EXAM BY PATHOLOGIST: CPT | Mod: 26,,, | Performed by: PATHOLOGY

## 2023-03-16 NOTE — PROCEDURES
Colposcopy    Date/Time: 3/16/2023 9:30 AM  Performed by: Татьяна Motta MD  Authorized by: Татьяна Motta MD     Consent Done?:  Yes (Written)  Timeout:Immediately prior to procedure a time out was called to verify the correct patient, procedure, equipment, support staff and site/side marked as required    Colposcopy Site:  Cervix  Position:  Supine  Acrowhite Lesion? Yes    Transformation Zone Adequate?: No    Biopsy?: Yes         Location:  Cervix ((4 00))  ECC Performed?: Yes    LEEP Performed?: No    Estimated blood loss (cc):  10   Patient tolerated the procedure well with no immediate complications.   Post-operative instructions were provided for the patient.   Patient was discharged and will follow up if any complications occur

## 2023-03-21 ENCOUNTER — TELEPHONE (OUTPATIENT)
Dept: OBSTETRICS AND GYNECOLOGY | Facility: CLINIC | Age: 58
End: 2023-03-21
Payer: COMMERCIAL

## 2023-03-21 DIAGNOSIS — D06.9 HIGH GRADE SQUAMOUS INTRAEPITHELIAL LESION (HGSIL), GRADE 3 CIN, ON BIOPSY OF CERVIX: Primary | ICD-10-CM

## 2023-03-21 LAB
FINAL PATHOLOGIC DIAGNOSIS: NORMAL
GROSS: NORMAL
Lab: NORMAL

## 2023-03-21 NOTE — TELEPHONE ENCOUNTER
----- Message from Татьяна Motta MD sent at 3/21/2023 11:17 AM CDT -----  Requested Date: 4/19   Requested Time: noon   Case Length:30 minutes    Surgeon: Татьяна Motta      Assistant Surgeon: None   Visit Type: Outpatient    LOCATION: [facility]    PROCEDURE: CKC  Diagnosis: MIGEL 3   Anesthesia type: General   Comments/Special Equipment Needed:  Rep needed: no  Does the patient need a PreOp appointment with MD: yes  U/S needed at pre-op: no  PCP clearance: Not Needed  When does she need her Post op appointment: 2 weeks in person  Do you need additional time blocked out from clinic? no  If so, what time do you want to be back in clinic? N/a  When can the patient go back to work? 2 days      Carmel is putting this in the system and will confirm time when we get closer.

## 2023-03-30 ENCOUNTER — LAB VISIT (OUTPATIENT)
Dept: LAB | Facility: HOSPITAL | Age: 58
End: 2023-03-30
Attending: INTERNAL MEDICINE
Payer: COMMERCIAL

## 2023-03-30 ENCOUNTER — OFFICE VISIT (OUTPATIENT)
Dept: HEMATOLOGY/ONCOLOGY | Facility: CLINIC | Age: 58
End: 2023-03-30
Payer: COMMERCIAL

## 2023-03-30 DIAGNOSIS — Z80.3 FAMILY HISTORY OF MALIGNANT NEOPLASM OF BREAST: ICD-10-CM

## 2023-03-30 DIAGNOSIS — Z85.3 PERSONAL HISTORY OF BREAST CANCER: ICD-10-CM

## 2023-03-30 DIAGNOSIS — Z80.3 FAMILY HISTORY OF MALIGNANT NEOPLASM OF BREAST: Primary | ICD-10-CM

## 2023-03-30 PROCEDURE — 36415 COLL VENOUS BLD VENIPUNCTURE: CPT | Performed by: INTERNAL MEDICINE

## 2023-03-30 PROCEDURE — 99999 PR PBB SHADOW E&M-EST. PATIENT-LVL III: ICD-10-PCS | Mod: PBBFAC,,,

## 2023-03-30 PROCEDURE — 1159F PR MEDICATION LIST DOCUMENTED IN MEDICAL RECORD: ICD-10-PCS | Mod: CPTII,S$GLB,, | Performed by: INTERNAL MEDICINE

## 2023-03-30 PROCEDURE — 99499 UNLISTED E&M SERVICE: CPT | Mod: S$GLB,,, | Performed by: INTERNAL MEDICINE

## 2023-03-30 PROCEDURE — 96040 PR GENETIC COUNSELING, EACH 30 MIN: ICD-10-PCS | Mod: S$GLB,,, | Performed by: INTERNAL MEDICINE

## 2023-03-30 PROCEDURE — 96040 PR GENETIC COUNSELING, EACH 30 MIN: CPT | Mod: S$GLB,,, | Performed by: INTERNAL MEDICINE

## 2023-03-30 PROCEDURE — 99999 PR PBB SHADOW E&M-EST. PATIENT-LVL III: CPT | Mod: PBBFAC,,,

## 2023-03-30 PROCEDURE — 99499 NO LOS: ICD-10-PCS | Mod: S$GLB,,, | Performed by: INTERNAL MEDICINE

## 2023-03-30 PROCEDURE — 1159F MED LIST DOCD IN RCRD: CPT | Mod: CPTII,S$GLB,, | Performed by: INTERNAL MEDICINE

## 2023-03-30 NOTE — PROGRESS NOTES
"Cancer Genetics  Hereditary and High-Risk Clinic  Department of Hematology and Oncology  [facility]    [facility]    Date of Service:  3/30/23  Visit Provider:  Lu Benitez, [facility] [place], [place], [facility]    Patient ID  Name: Radha Mario    : 1965    MRN: [id number]      Referring Provider  Татьяна Motta MD  [address] [address]    IMPRESSION      Ms. Mario is a 58yo female with a personal history of early-onset breast cancer, for which she meets NCCN criteria for genetic testing. Her mother was also recently diagnosed with breast cancer, and a likely pathogenic CHEK2 mutation was found upon her testing at [facility], but it is unclear whether the mutation is germline or somatic. Therefore, panel testing is still indicated for Ms. Mario, especially since her diagnosis is much more suspicious than her mother's at 83yo. A sample was submitted on 3/30/23 to [facility] for BRCANext-Expanded +RNAinsight panel testing, along with informed consent and insurance information.    FOCUSED PERSONAL HISTORY     Chief Complaint: Genetic Evaluation (For personal and family history of breast cancer)    History of Present Illness (HPI):  Radha Mario ("Radha"), 57 y.o., assigned female sex at birth, of Hebrew/Lebanese (maternal) and Spanish/English (paternal) descent, is new to the [facility] of [facility] and [facility] and to me.  She was referred by  OB/GYN  for hereditary cancer risk assessment given her personal and family history of breast cancer.    Ms. 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 These records are not available for review, but her mammogram record notes a scar on the left breast.    -Mammogram on 22:  There is no mammographic or sonographic abnormality in the palpable area of concern as indicated by the patient in the left breast. There is no mammographic evidence " of malignancy in either breast (BIRADS 2)    Tobacco Use  Tobacco Use: High Risk    Smoking Tobacco Use: Every Day    Smokeless Tobacco Use: Never    Passive Exposure: Not on file     Review of Systems   Patient's Distress Score today was 2/10 (with 10 being the worst).  Patient denies experiencing suicidal or homicidal ideations (SI/HI).      FAMILY HISTORY         Ms. Mario reported her family history of cancer as follows:  Mother: 83yo who was recently diagnosed with breast cancer and is being treated by Dr. Rodriguez; had genetic testing through [facility] that identified a likely pathogenic mutation in CHEK2 (c.684-1G>A), but it is suspected to be somatic based on the VAF <30%    A family history of birth defects, intellectual disability, SIDS, sudden early death, multiple miscarriages and consanguinity were denied. Please refer to above pedigree for further details. A larger copy has been scanned in the Media tab.     DISCUSSION     Approximately 5-10% of breast cancers are due to hereditary causes. The majority of hereditary breast cancers (>50%) are due to mutations in BRCA1 or BRCA2.  Around 12-30% are due to mutations in other highly penetrant genes, such as PALB2, PTEN and TP53, or in moderately penetrant genes, such as ANISA, BARD1, and CHEK2.  The remaining percentage are caused by unknown/unidentified genes. Ms. Mario meets NCCN criteria for genetic testing based on her personal history of early-onset breast cancer. While her mother did have a likely pathogenic mutation in CHEK2 (c.684-1G>A) identified upon her testing through [facility], the VAF was <30% meaning it is suspected to be somatic (acquired; not a mutation that can be passed down to children). As Ms. Mario's own diagnosis is more suspicious than her mother's, panel testing is still indicated for her. Her results may also provide more clarity regarding her mother's results.    Therefore, she was offered phenotype-driven and broad panel  testing. Ms. Mario opted for the BRCANext-Expanded +Trunk Archive panel through [facility] of the following 23 genes associated with hereditary breast, gynecologic and other cancers:    ANISA, BARD1, BRCA1, BRCA2, BRIP1, CDH1, CHEK2, DICER1, EPCAM, MLH1, MSH2, MSH6, NBN, NF1, PALB2, PMS2, PTEN, RAD51C, RAD51D, RECQL, SMARCA4, STK11, TP53    We reviewed that mutations in the highly penetrant genes put an individual at a significantly increased risk of breast and other cancers.  There are established screening and surgery guidelines for these syndromes. Mutations in the moderately penetrant genes increase the risk of breast and other cancers, but less is understood regarding their role in cancer risk. There may not be standard screening or management guidelines for individuals who have mutations in these genes. We discussed the autosomal dominant inheritance of most of these conditions, and that if Ms. Mario tests positive for a mutation in one of these genes, then there would be a 50% chance her first degree relatives (parents, siblings, children) could also have inherited the same mutation. Individuals in her family could consider undergoing predictive genetic testing at an appropriate age to determine their mutation status and their lifetime risk of cancer.     Furthermore, we discussed the psychosocial implications of a positive result, including anxiety, fear and guilt if a mutation is passed on to a child. Ms. Mario did not express concern.    The potential outcomes of testing, including the high VUS (variant of unknown significance) rate seen in panel testing, were reviewed and implications were discussed. There is also a possibility for the patient to incur out-of-pocket costs related to this testing. Issues regarding insurance discrimination were discussed. SILAS protects against employment and health insurance discrimination, but it does not apply to life insurance, long term care or disability policies. It  "also does not apply to  personnel or employers with less than 15 employees. Ms. Mario appeared to have a good understanding of the information as she asked appropriate questions.  A sample was submitted on 3/30/23 to [facility]. Ms. Mario's results should be available in approximately 3 weeks.  In the meantime, she is welcome to contact me if she has any questions, concerns, or updates to her family history.     Ms. Mario received comprehensive counseling regarding panel testing and has elected to proceed with this testing.     ASSESSMENT / PLAN      Radha Mario ("Radha"), 57 y.o., presented today for hereditary breast cancer risk assessment, and pre-test cancer genetic counseling was conducted.          ICD-10-CM ICD-9-CM   1. Family history of malignant neoplasm of breast  Z80.3 V16.3   2. Personal history of breast cancer  Z85.3 V10.3     1. Personal history of breast cancer  - Ambulatory referral/consult to Genetics  - Genetic Misc Sendout Test, Blood; Future    2. Family history of malignant neoplasm of breast  - Genetic Misc Sendout Test, Blood; Future       Genetic Test Information  Testing lab: [facility]   Test panel: BRCANext-Expanded +RNAinsight  (Core:  BRCA1/BRCA2)   ICD-10 code(s): Z85.3, Z80.3   Verbal informed consent: Obtained   Written informed consent: Obtained   Specimen type: Blood  (Patient denies blood disorders that would necessitate a skin fibroblast specimen)   Specimen collection by: [facility]   Specimen collection date: 03/30/2023   Results expected by: Approximately 2-3 weeks after the genetic testing lab receives the specimen   Results disclosure plan: Post-test visit if positive or complex result; otherwise, results will be communicated through phone call       Follow-up:  Follow up if results are positive for a pathogenic mutation.    Questions were encouraged and answered to the patient's satisfaction, and she verbalized understanding of the information and " agreement with the plan.         Approximately 25 minutes were spent face-to-face with the patient.  Approximately 75 minutes in total were spent on this encounter, which includes face-to-face time and non-face-to-face time preparing to see the patient (e.g., review of tests), obtaining and/or reviewing separately obtained history, documenting clinical information in the electronic or other health record, independently interpreting results (not separately reported) and communicating results to the patient/family/caregiver, or care coordination (not separately reported).     This assessment is based on the history and reports provided, as well as the current scientific knowledge regarding cancer genetics.         Lu Benitez, [facility] [place]  Senior Genetic Counselor, Hereditary and High-Risk Clinic  Department of Hematology and Oncology  [facility]        CC:  Dr. Татьяна Motta

## 2023-04-03 ENCOUNTER — OFFICE VISIT (OUTPATIENT)
Dept: OBSTETRICS AND GYNECOLOGY | Facility: CLINIC | Age: 58
End: 2023-04-03
Payer: COMMERCIAL

## 2023-04-03 VITALS
SYSTOLIC BLOOD PRESSURE: 98 MMHG | DIASTOLIC BLOOD PRESSURE: 64 MMHG | HEIGHT: 67 IN | BODY MASS INDEX: 21.56 KG/M2 | WEIGHT: 137.38 LBS

## 2023-04-03 DIAGNOSIS — D06.9 HIGH GRADE SQUAMOUS INTRAEPITHELIAL LESION (HGSIL), GRADE 3 CIN, ON BIOPSY OF CERVIX: Primary | ICD-10-CM

## 2023-04-03 PROCEDURE — 99499 UNLISTED E&M SERVICE: CPT | Mod: S$GLB,,, | Performed by: OBSTETRICS & GYNECOLOGY

## 2023-04-03 PROCEDURE — 3078F DIAST BP <80 MM HG: CPT | Mod: CPTII,S$GLB,, | Performed by: OBSTETRICS & GYNECOLOGY

## 2023-04-03 PROCEDURE — 3074F PR MOST RECENT SYSTOLIC BLOOD PRESSURE < 130 MM HG: ICD-10-PCS | Mod: CPTII,S$GLB,, | Performed by: OBSTETRICS & GYNECOLOGY

## 2023-04-03 PROCEDURE — 99999 PR PBB SHADOW E&M-EST. PATIENT-LVL III: ICD-10-PCS | Mod: PBBFAC,,, | Performed by: OBSTETRICS & GYNECOLOGY

## 2023-04-03 PROCEDURE — 99999 PR PBB SHADOW E&M-EST. PATIENT-LVL III: CPT | Mod: PBBFAC,,, | Performed by: OBSTETRICS & GYNECOLOGY

## 2023-04-03 PROCEDURE — 1160F RVW MEDS BY RX/DR IN RCRD: CPT | Mod: CPTII,S$GLB,, | Performed by: OBSTETRICS & GYNECOLOGY

## 2023-04-03 PROCEDURE — 3008F PR BODY MASS INDEX (BMI) DOCUMENTED: ICD-10-PCS | Mod: CPTII,S$GLB,, | Performed by: OBSTETRICS & GYNECOLOGY

## 2023-04-03 PROCEDURE — 1159F MED LIST DOCD IN RCRD: CPT | Mod: CPTII,S$GLB,, | Performed by: OBSTETRICS & GYNECOLOGY

## 2023-04-03 PROCEDURE — 3078F PR MOST RECENT DIASTOLIC BLOOD PRESSURE < 80 MM HG: ICD-10-PCS | Mod: CPTII,S$GLB,, | Performed by: OBSTETRICS & GYNECOLOGY

## 2023-04-03 PROCEDURE — 1160F PR REVIEW ALL MEDS BY PRESCRIBER/CLIN PHARMACIST DOCUMENTED: ICD-10-PCS | Mod: CPTII,S$GLB,, | Performed by: OBSTETRICS & GYNECOLOGY

## 2023-04-03 PROCEDURE — 99499 NO LOS: ICD-10-PCS | Mod: S$GLB,,, | Performed by: OBSTETRICS & GYNECOLOGY

## 2023-04-03 PROCEDURE — 3008F BODY MASS INDEX DOCD: CPT | Mod: CPTII,S$GLB,, | Performed by: OBSTETRICS & GYNECOLOGY

## 2023-04-03 PROCEDURE — 1159F PR MEDICATION LIST DOCUMENTED IN MEDICAL RECORD: ICD-10-PCS | Mod: CPTII,S$GLB,, | Performed by: OBSTETRICS & GYNECOLOGY

## 2023-04-03 PROCEDURE — 3074F SYST BP LT 130 MM HG: CPT | Mod: CPTII,S$GLB,, | Performed by: OBSTETRICS & GYNECOLOGY

## 2023-04-03 NOTE — H&P
[facility]  Obstetrics & Gynecology  History & Physical    Patient Name: Radha Mario  MRN: [id number]  Admission Date: (Not on file)  Primary Care Provider: Nicky Mckenna MD    Subjective:     Chief Complaint/Reason for Admission: MIGEL 3    History of Present Illness:  Patient presents for cold knife conization (CKC) of cervix due to biopsy proven MIGEL 3. She has recently quit smoking.     No current facility-administered medications on file prior to encounter.     Current Outpatient Medications on File Prior to Encounter   Medication Sig    azelastine (ASTELIN) 137 mcg (0.1 %) nasal spray SPRAY 1 SPRAY BY NASAL ROUTE TWICE DAILY (Patient not taking: Reported on 4/3/2023)    biotin 10,000 mcg Cap Take by mouth.    ibandronate (BONIVA) 150 mg tablet TAKE 1 TABLET BY MOUTH EVERY 30 DAYS.    ibandronate (BONIVA) 150 mg tablet Take 1 tablet (150 mg total) by mouth every 30 days.    magnesium 250 mg Tab Take by mouth once.    multivitamin (THERAGRAN) per tablet Take 1 tablet by mouth once daily.    raloxifene (EVISTA) 60 mg tablet Take 1 tablet (60 mg total) by mouth once daily.    valACYclovir (VALTREX) 500 MG tablet TAKE 1 TABLET (500 MG TOTAL) BY MOUTH 2 (TWO) TIMES DAILY. FOR 3 DAYS AS NEEDED FOR COLD SORES. (Patient not taking: Reported on 3/6/2023)    varenicline (CHANTIX) 1 mg Tab Take 1 tablet (1 mg total) by mouth 2 (two) times daily.       Review of patient's allergies indicates:  No Known Allergies    Past Medical History:   Diagnosis Date    Abnormal Pap smear of cervix 2018    Hpv +      Anxiety     Breast cancer 2001    Left Lumpectomy     Menopause      OB History    Para Term  AB Living   2 2 2     1   SAB IAB Ectopic Multiple Live Births           1      # Outcome Date GA Lbr Warren/2nd Weight Sex Delivery Anes PTL Lv   2 Term  40w0d  3.232 kg (7 lb 2 oz) F Vag-Spont EPI  MAAME   1 Term  40w0d  3.118 kg (6 lb 14 oz) F Vag-Spont EPI        Obstetric  Comments   Menarche ~ 13     Past Surgical History:   Procedure Laterality Date    AUGMENTATION OF BREAST  2000    BREAST LUMPECTOMY  2001    then radiation     Family History       Problem Relation (Age of Onset)    Breast cancer Mother    Hypertension Mother    Stroke Father          Tobacco Use    Smoking status: Every Day    Smokeless tobacco: Never   Substance and Sexual Activity    Alcohol use: Yes     Comment: socailly     Drug use: No    Sexual activity: Yes     Partners: Male     Birth control/protection: None, Post-menopausal     Comment: :        2001     Review of Systems  Review of Systems - General ROS: negative  Psychological ROS: negative  Respiratory ROS: no cough, shortness of breath, or wheezing  Cardiovascular ROS: no chest pain or dyspnea on exertion  Gastrointestinal ROS: no abdominal pain, change in bowel habits, or black/bloody stools  Genito-Urinary ROS: no dysuria, trouble voiding, or hematuria  Neurological ROS: no TIA or stroke symptoms  Skin ROS: warm, dry, no rashes  Musculoskeletal ROS: no deformities  Allergy ROS: no sneezing or coughing  Breasts ROS: no masses or nipple discharge      Objective:     Vital Signs (Most Recent):    Vital Signs (24h Range):  BP: ()/()   Arterial Line BP: ()/()         There is no height or weight on file to calculate BMI.  No LMP recorded (lmp unknown). Patient is postmenopausal.    Physical Exam  Gen: AAO x 3, NAD  VS: see admit vitals  Heart: RRR  Lungs: CTA bilaterally  Abdomen: soft, NT/ND  Pelvic: deferred  Ext: no CCE    Laboratory:  Preop CBC and CMP ordered    Diagnostic Results:  1. ENDOCERVICAL CURETTAGE SHOWS SCANT FRAGMENTS OF BENIGN ENDOCERVICAL   GLANDULAR EPITHELIUM AND A DETACHED FRAGMENT OF SQUAMOUS EPITHELIUM WITH   SEVERE DYSPLASIA (MIGEL 3).   2. CERVICAL BIOPSY AT 4 O'CLOCK SHOWS A DETACHED FRAGMENT OF SQUAMOUS   EPITHELIUM WITH MODERATE TO SEVERE DYSPLASIA (MIGEL 2-3).     Assessment/Plan:     MIGEL 3    - signed consents and  answered question. CKC and post CKC ECC on 4/19.  -     Татьяна Motta MD  Obstetrics & Gynecology  [facility] - Surgery [facility])

## 2023-04-03 NOTE — PROGRESS NOTES
Preop    [facility]  Obstetrics & Gynecology  History & Physical    Patient Name: Radha Mario  MRN: [id number]  Admission Date: (Not on file)  Primary Care Provider: Nicky Mckenna MD    Subjective:     Chief Complaint/Reason for Admission: MIGEL 3    History of Present Illness:  Patient presents for cold knife conization (CKC) of cervix due to biopsy proven MIGEL 3. She has recently quit smoking.     No current facility-administered medications on file prior to encounter.     Current Outpatient Medications on File Prior to Encounter   Medication Sig    azelastine (ASTELIN) 137 mcg (0.1 %) nasal spray SPRAY 1 SPRAY BY NASAL ROUTE TWICE DAILY (Patient not taking: Reported on 4/3/2023)    biotin 10,000 mcg Cap Take by mouth.    ibandronate (BONIVA) 150 mg tablet TAKE 1 TABLET BY MOUTH EVERY 30 DAYS.    ibandronate (BONIVA) 150 mg tablet Take 1 tablet (150 mg total) by mouth every 30 days.    magnesium 250 mg Tab Take by mouth once.    multivitamin (THERAGRAN) per tablet Take 1 tablet by mouth once daily.    raloxifene (EVISTA) 60 mg tablet Take 1 tablet (60 mg total) by mouth once daily.    valACYclovir (VALTREX) 500 MG tablet TAKE 1 TABLET (500 MG TOTAL) BY MOUTH 2 (TWO) TIMES DAILY. FOR 3 DAYS AS NEEDED FOR COLD SORES. (Patient not taking: Reported on 3/6/2023)    varenicline (CHANTIX) 1 mg Tab Take 1 tablet (1 mg total) by mouth 2 (two) times daily.       Review of patient's allergies indicates:  No Known Allergies    Past Medical History:   Diagnosis Date    Abnormal Pap smear of cervix 2018    Hpv +      Anxiety     Breast cancer 2001    Left Lumpectomy     Menopause      OB History    Para Term  AB Living   2 2 2     1   SAB IAB Ectopic Multiple Live Births           1      # Outcome Date GA Lbr Warren/2nd Weight Sex Delivery Anes PTL Lv   2 Term  40w0d  3.232 kg (7 lb 2 oz) F Vag-Spont EPI  MAAME   1 Term  40w0d  3.118 kg (6 lb 14 oz) F Vag-Spont EPI         Obstetric Comments   Menarche ~ 13     Past Surgical History:   Procedure Laterality Date    AUGMENTATION OF BREAST  2000    BREAST LUMPECTOMY  2001    then radiation     Family History       Problem Relation (Age of Onset)    Breast cancer Mother    Hypertension Mother    Stroke Father          Tobacco Use    Smoking status: Every Day    Smokeless tobacco: Never   Substance and Sexual Activity    Alcohol use: Yes     Comment: socailly     Drug use: No    Sexual activity: Yes     Partners: Male     Birth control/protection: None, Post-menopausal     Comment: :        2001     Review of Systems  Review of Systems - General ROS: negative  Psychological ROS: negative  Respiratory ROS: no cough, shortness of breath, or wheezing  Cardiovascular ROS: no chest pain or dyspnea on exertion  Gastrointestinal ROS: no abdominal pain, change in bowel habits, or black/bloody stools  Genito-Urinary ROS: no dysuria, trouble voiding, or hematuria  Neurological ROS: no TIA or stroke symptoms  Skin ROS: warm, dry, no rashes  Musculoskeletal ROS: no deformities  Allergy ROS: no sneezing or coughing  Breasts ROS: no masses or nipple discharge      Objective:     Vital Signs (Most Recent):    Vital Signs (24h Range):  BP: ()/()   Arterial Line BP: ()/()         There is no height or weight on file to calculate BMI.  No LMP recorded (lmp unknown). Patient is postmenopausal.    Physical Exam  Gen: AAO x 3, NAD  VS: see admit vitals  Heart: RRR  Lungs: CTA bilaterally  Abdomen: soft, NT/ND  Pelvic: deferred  Ext: no CCE    Laboratory:  Preop CBC and CMP ordered    Diagnostic Results:  1. ENDOCERVICAL CURETTAGE SHOWS SCANT FRAGMENTS OF BENIGN ENDOCERVICAL   GLANDULAR EPITHELIUM AND A DETACHED FRAGMENT OF SQUAMOUS EPITHELIUM WITH   SEVERE DYSPLASIA (MIGEL 3).   2. CERVICAL BIOPSY AT 4 O'CLOCK SHOWS A DETACHED FRAGMENT OF SQUAMOUS   EPITHELIUM WITH MODERATE TO SEVERE DYSPLASIA (MIGEL 2-3).     Assessment/Plan:     MIGEL 3    - signed  consents and answered question. CKC and post CKC ECC on 4/19.  -     Татьяна Motta MD  Obstetrics & Gynecology  [facility] - Surgery [facility])

## 2023-04-06 ENCOUNTER — ANESTHESIA EVENT (OUTPATIENT)
Dept: SURGERY | Facility: OTHER | Age: 58
End: 2023-04-06
Payer: COMMERCIAL

## 2023-04-06 ENCOUNTER — HOSPITAL ENCOUNTER (OUTPATIENT)
Dept: PREADMISSION TESTING | Facility: OTHER | Age: 58
Discharge: HOME | End: 2023-04-06
Attending: OBSTETRICS & GYNECOLOGY
Payer: COMMERCIAL

## 2023-04-06 VITALS
HEIGHT: 67 IN | SYSTOLIC BLOOD PRESSURE: 111 MMHG | DIASTOLIC BLOOD PRESSURE: 52 MMHG | HEART RATE: 80 BPM | BODY MASS INDEX: 21.56 KG/M2 | WEIGHT: 137.38 LBS | OXYGEN SATURATION: 99 %

## 2023-04-06 DIAGNOSIS — D06.9 HIGH GRADE SQUAMOUS INTRAEPITHELIAL LESION (HGSIL), GRADE 3 CIN, ON BIOPSY OF CERVIX: ICD-10-CM

## 2023-04-06 LAB
ALBUMIN SERPL BCP-MCNC: 3.8 G/DL (ref 3.5–5.2)
ALP SERPL-CCNC: 50 U/L (ref 55–135)
ALT SERPL W/O P-5'-P-CCNC: 15 U/L (ref 10–44)
ANION GAP SERPL CALC-SCNC: 6 MMOL/L (ref 8–16)
AST SERPL-CCNC: 17 U/L (ref 10–40)
BASOPHILS # BLD AUTO: 0.03 K/UL (ref 0–0.2)
BASOPHILS NFR BLD: 0.4 % (ref 0–1.9)
BILIRUB SERPL-MCNC: 0.4 MG/DL (ref 0.1–1)
BUN SERPL-MCNC: 12 MG/DL (ref 6–20)
CALCIUM SERPL-MCNC: 9.3 MG/DL (ref 8.7–10.5)
CHLORIDE SERPL-SCNC: 105 MMOL/L (ref 95–110)
CO2 SERPL-SCNC: 27 MMOL/L (ref 23–29)
CREAT SERPL-MCNC: 0.8 MG/DL (ref 0.5–1.4)
DIFFERENTIAL METHOD: NORMAL
EOSINOPHIL # BLD AUTO: 0.1 K/UL (ref 0–0.5)
EOSINOPHIL NFR BLD: 1.1 % (ref 0–8)
ERYTHROCYTE [DISTWIDTH] IN BLOOD BY AUTOMATED COUNT: 12.9 % (ref 11.5–14.5)
EST. GFR  (NO RACE VARIABLE): >60 ML/MIN/1.73 M^2
GLUCOSE SERPL-MCNC: 84 MG/DL (ref 70–110)
HCT VFR BLD AUTO: 40.5 % (ref 37–48.5)
HGB BLD-MCNC: 13.4 G/DL (ref 12–16)
IMM GRANULOCYTES # BLD AUTO: 0.02 K/UL (ref 0–0.04)
IMM GRANULOCYTES NFR BLD AUTO: 0.3 % (ref 0–0.5)
LYMPHOCYTES # BLD AUTO: 2 K/UL (ref 1–4.8)
LYMPHOCYTES NFR BLD: 27.8 % (ref 18–48)
MCH RBC QN AUTO: 30.6 PG (ref 27–31)
MCHC RBC AUTO-ENTMCNC: 33.1 G/DL (ref 32–36)
MCV RBC AUTO: 93 FL (ref 82–98)
MONOCYTES # BLD AUTO: 0.7 K/UL (ref 0.3–1)
MONOCYTES NFR BLD: 9.4 % (ref 4–15)
NEUTROPHILS # BLD AUTO: 4.3 K/UL (ref 1.8–7.7)
NEUTROPHILS NFR BLD: 61 % (ref 38–73)
NRBC BLD-RTO: 0 /100 WBC
PLATELET # BLD AUTO: 282 K/UL (ref 150–450)
PMV BLD AUTO: 10.7 FL (ref 9.2–12.9)
POTASSIUM SERPL-SCNC: 4.1 MMOL/L (ref 3.5–5.1)
PROT SERPL-MCNC: 6.6 G/DL (ref 6–8.4)
RBC # BLD AUTO: 4.38 M/UL (ref 4–5.4)
SODIUM SERPL-SCNC: 138 MMOL/L (ref 136–145)
WBC # BLD AUTO: 7.05 K/UL (ref 3.9–12.7)

## 2023-04-06 PROCEDURE — 36415 COLL VENOUS BLD VENIPUNCTURE: CPT | Performed by: OBSTETRICS & GYNECOLOGY

## 2023-04-06 PROCEDURE — 80053 COMPREHEN METABOLIC PANEL: CPT | Performed by: OBSTETRICS & GYNECOLOGY

## 2023-04-06 PROCEDURE — 85025 COMPLETE CBC W/AUTO DIFF WBC: CPT | Performed by: OBSTETRICS & GYNECOLOGY

## 2023-04-06 RX ORDER — SODIUM CHLORIDE, SODIUM LACTATE, POTASSIUM CHLORIDE, CALCIUM CHLORIDE 600; 310; 30; 20 MG/100ML; MG/100ML; MG/100ML; MG/100ML
INJECTION, SOLUTION INTRAVENOUS CONTINUOUS
Status: CANCELLED | OUTPATIENT
Start: 2023-04-06

## 2023-04-06 RX ORDER — LIDOCAINE HYDROCHLORIDE 10 MG/ML
0.5 INJECTION, SOLUTION EPIDURAL; INFILTRATION; INTRACAUDAL; PERINEURAL ONCE
Status: CANCELLED | OUTPATIENT
Start: 2023-04-06 | End: 2023-04-06

## 2023-04-06 NOTE — DISCHARGE INSTRUCTIONS
Information to Prepare you for your Surgery    PRE-ADMIT TESTING -  [phone]    [address] [facility]          Your surgery has been scheduled at [facility]. We are pleased to have the opportunity to serve you. For Further Information please call [phone].    On the day of surgery please report to the Information Desk on the 1st floor.    CONTACT YOUR PHYSICIAN'S OFFICE THE DAY PRIOR TO YOUR SURGERY TO OBTAIN YOUR ARRIVAL TIME.     The evening before surgery do not eat anything after 9 p.m. ( this includes hard candy, chewing gum and mints).  You may only have GATORADE, POWERADE AND WATER  from 9 p.m. until you leave your home.   DO NOT DRINK ANY LIQUIDS ON THE WAY TO THE HOSPITAL.      Why does your anesthesiologist allow you to drink Gatorade/Powerade before surgery?  Gatorade/Powerade helps to increase your comfort before surgery and to decrease your nausea after surgery. The carbohydrates in Gatorade/Powerade help reduce your body's stress response to surgery.  If you are a diabetic-drink only water prior to surgery.       Patients may have 2 visitors pre and post procedure. Only 2 visitors will be allowed in the Surgical building with the patient. No one under the age of 12 will be allowed into the facility.    SPECIAL MEDICATION INSTRUCTIONS: TAKE medications checked off by the Anesthesiologist on your Medication List.    Angiogram Patients: Take medications as instructed by your physician, including aspirin.     Surgery Patients:    If you take ASPIRIN - Your PHYSICIAN/SURGEON will need to inform you IF/OR when you need to stop taking aspirin prior to your surgery.     The week prior to surgery do not ot take any medications containing IBUPROFEN or NSAIDS ( Advil, Motrin, Goodys, BC, Aleve, Naproxen etc) If you are not sure if you should take a medicine please call your surgeon's office.  Ok to take Tylenol    Do Not Wear any make-up  (especially eye make-up) to surgery. Please remove any false eyelashes or eyelash extensions. If you arrive the day of surgery with makeup/eyelashes on you will be required to remove prior to surgery. (There is a risk of corneal abrasions if eye makeup/eyelash extensions are not removed)      Leave all valuables at home.   Do Not wear any jewelry or watches, including any metal in body piercings. Jewelry must be removed prior to coming to the hospital.  There is a possibility that rings that are unable to be removed may be cut off if they are on the surgical extremity.    Please remove all hair extensions, wigs, clips and any other metal accessories/ ornaments from your hair.  These items may pose a flammable/fire risk in Surgery and must be removed.    Do not shave your surgical area at least 5 days prior to your surgery. The surgical prep will be performed at the hospital according to Infection Control regulations.    Contact Lens must be removed before surgery. Either do not wear the contact lens or bring a case and solution for storage.  Please bring a container for eyeglasses or dentures as required.  Bring any paperwork your physician has provided, such as consent forms,  history and physicals, doctor's orders, etc.   Bring comfortable clothes that are loose fitting to wear upon discharge. Take into consideration the type of surgery being performed.  Maintain your diet as advised per your physician the day prior to surgery.      Adequate rest the night before surgery is advised.   Park in the Parking lot behind the hospital or in the [facility] across the street from the parking lot. Parking is complimentary.  If you will be discharged the same day as your procedure, please arrange for a responsible adult to drive you home or to accompany you if traveling by taxi.   YOU WILL NOT BE PERMITTED TO DRIVE OR TO LEAVE THE HOSPITAL ALONE AFTER SURGERY.   If you are being discharged the same day, it is  strongly recommended that you arrange for someone to remain with you for the first 24 hrs following your surgery.    The Surgeon will speak to your family/visitor after your surgery regarding the outcome of your surgery and post op care.  The Surgeon may speak to you after your surgery, but there is a possibility you may not remember the details.  Please check with your family members regarding the conversation with the Surgeon.    We strongly recommend whoever is bringing you home be present for discharge instructions.  This will ensure a thorough understanding for your post op home care.    ALL CHILDREN MUST ALWAYS BE ACCOMPANIED BY AN ADULT.    Visitors-Refer to current Visitor policy handouts.    Thank you for your cooperation.  The Staff of [facility].            Bathing Instructions with Hibiclens    Shower the evening before and morning of your procedure with Chlorhexidine (Hibiclens)  do not use Chlorhexidine on your face or genitals. Do not get in your eyes.  Wash your face with water and your regular face wash/soap  Use your regular shampoo  Apply Chlorhexidine (Hibiclens) directly on your skin or on a wet washcloth and wash gently. When showering: Move away from the shower stream when applying Chlorhexidine (Hibiclens) to avoid rinsing off too soon.  Rinse thoroughly with warm water  Do not dilute Chlorhexidine (Hibiclens)   Dry off as usual, do not use any deodorant, powder, body lotions, perfume, after shave or cologne.

## 2023-04-06 NOTE — ANESTHESIA PREPROCEDURE EVALUATION
04/06/2023  Radha Mario is a 57 y.o., female.      Pre-op Assessment    I have reviewed the Patient Summary Reports.     I have reviewed the Nursing Notes. I have reviewed the NPO Status.   I have reviewed the Medications.     Review of Systems  Anesthesia Hx:  No previous Anesthesia  History of prior surgery of interest to airway management or planning: Previous anesthesia: General Lumpectomy with general anesthesia.  Denies Family Hx of Anesthesia complications.   Denies Personal Hx of Anesthesia complications.   Social:  Non-Smoker, Former Smoker, Social Alcohol Use Recently stopped smoking   Hematology/Oncology:  Hematology Normal       -- Cancer in past history:  Breast   EENT/Dental:EENT/Dental Normal   Cardiovascular:  Cardiovascular Normal     Pulmonary:  Pulmonary Normal    Renal/:  Renal/ Normal     Hepatic/GI:  Hepatic/GI Normal    Musculoskeletal:  Musculoskeletal Normal    Neurological:  Neurology Normal    Endocrine:  Endocrine Normal    Dermatological:  Skin Normal    Psych:  Psychiatric Normal           Physical Exam  General: Cooperative, Alert and Oriented    Airway:  Mallampati: II   Mouth Opening: Normal  Neck ROM: Normal ROM    Dental:  Caps / Implants        Anesthesia Plan  Type of Anesthesia, risks & benefits discussed:    Anesthesia Type: Gen Supraglottic Airway  Intra-op Monitoring Plan: Standard ASA Monitors  Post Op Pain Control Plan: multimodal analgesia  Induction:  IV  Informed Consent: Informed consent signed with the Patient and all parties understand the risks and agree with anesthesia plan.  All questions answered.   ASA Score: 2  Anesthesia Plan Notes: Labs today    Ready For Surgery From Anesthesia Perspective.     .

## 2023-04-18 ENCOUNTER — PATIENT MESSAGE (OUTPATIENT)
Dept: SURGERY | Facility: OTHER | Age: 58
End: 2023-04-18
Payer: COMMERCIAL

## 2023-04-19 ENCOUNTER — HOSPITAL ENCOUNTER (OUTPATIENT)
Facility: OTHER | Age: 58
Discharge: HOME | End: 2023-04-19
Attending: OBSTETRICS & GYNECOLOGY | Admitting: OBSTETRICS & GYNECOLOGY
Payer: COMMERCIAL

## 2023-04-19 ENCOUNTER — ANESTHESIA (OUTPATIENT)
Dept: SURGERY | Facility: OTHER | Age: 58
End: 2023-04-19
Payer: COMMERCIAL

## 2023-04-19 DIAGNOSIS — D06.9 HIGH GRADE SQUAMOUS INTRAEPITHELIAL LESION (HGSIL), GRADE 3 CIN, ON BIOPSY OF CERVIX: Primary | ICD-10-CM

## 2023-04-19 PROCEDURE — 36000707: Performed by: OBSTETRICS & GYNECOLOGY

## 2023-04-19 PROCEDURE — 25000003 PHARM REV CODE 250: Performed by: OBSTETRICS & GYNECOLOGY

## 2023-04-19 PROCEDURE — 71000033 HC RECOVERY, INTIAL HOUR: Performed by: OBSTETRICS & GYNECOLOGY

## 2023-04-19 PROCEDURE — 71000015 HC POSTOP RECOV 1ST HR: Performed by: OBSTETRICS & GYNECOLOGY

## 2023-04-19 PROCEDURE — 57520 PR CONIZATION CERVIX,KNIFE/LASER: ICD-10-PCS | Mod: ,,, | Performed by: OBSTETRICS & GYNECOLOGY

## 2023-04-19 PROCEDURE — 88307 TISSUE EXAM BY PATHOLOGIST: CPT | Mod: 26,,, | Performed by: PATHOLOGY

## 2023-04-19 PROCEDURE — 88305 TISSUE EXAM BY PATHOLOGIST: CPT | Mod: 26,,, | Performed by: PATHOLOGY

## 2023-04-19 PROCEDURE — 88307 TISSUE EXAM BY PATHOLOGIST: CPT | Performed by: PATHOLOGY

## 2023-04-19 PROCEDURE — 37000008 HC ANESTHESIA 1ST 15 MINUTES: Performed by: OBSTETRICS & GYNECOLOGY

## 2023-04-19 PROCEDURE — 37000009 HC ANESTHESIA EA ADD 15 MINS: Performed by: OBSTETRICS & GYNECOLOGY

## 2023-04-19 PROCEDURE — 57520 CONIZATION OF CERVIX: CPT | Mod: ,,, | Performed by: OBSTETRICS & GYNECOLOGY

## 2023-04-19 PROCEDURE — 36000706: Performed by: OBSTETRICS & GYNECOLOGY

## 2023-04-19 PROCEDURE — 25000003 PHARM REV CODE 250: Performed by: ANESTHESIOLOGY

## 2023-04-19 PROCEDURE — 88342 IMHCHEM/IMCYTCHM 1ST ANTB: CPT | Mod: 26,,, | Performed by: PATHOLOGY

## 2023-04-19 PROCEDURE — 63600175 PHARM REV CODE 636 W HCPCS: Performed by: NURSE ANESTHETIST, CERTIFIED REGISTERED

## 2023-04-19 PROCEDURE — 25000003 PHARM REV CODE 250: Performed by: NURSE ANESTHETIST, CERTIFIED REGISTERED

## 2023-04-19 PROCEDURE — 63600175 PHARM REV CODE 636 W HCPCS: Performed by: ANESTHESIOLOGY

## 2023-04-19 PROCEDURE — 71000039 HC RECOVERY, EACH ADD'L HOUR: Performed by: OBSTETRICS & GYNECOLOGY

## 2023-04-19 PROCEDURE — 88307 PR  SURG PATH,LEVEL V: ICD-10-PCS | Mod: 26,,, | Performed by: PATHOLOGY

## 2023-04-19 PROCEDURE — 71000016 HC POSTOP RECOV ADDL HR: Performed by: OBSTETRICS & GYNECOLOGY

## 2023-04-19 PROCEDURE — 88305 TISSUE EXAM BY PATHOLOGIST: CPT | Performed by: PATHOLOGY

## 2023-04-19 PROCEDURE — 88305 TISSUE EXAM BY PATHOLOGIST: ICD-10-PCS | Mod: 26,,, | Performed by: PATHOLOGY

## 2023-04-19 PROCEDURE — 88342 CHG IMMUNOCYTOCHEMISTRY: ICD-10-PCS | Mod: 26,,, | Performed by: PATHOLOGY

## 2023-04-19 RX ORDER — IBUPROFEN 600 MG/1
600 TABLET ORAL EVERY 6 HOURS PRN
Status: CANCELLED | OUTPATIENT
Start: 2023-04-19

## 2023-04-19 RX ORDER — DIPHENHYDRAMINE HCL 25 MG
25 CAPSULE ORAL EVERY 4 HOURS PRN
Status: CANCELLED | OUTPATIENT
Start: 2023-04-19

## 2023-04-19 RX ORDER — IBUPROFEN 600 MG/1
600 TABLET ORAL 3 TIMES DAILY
Qty: 30 TABLET | Refills: 0 | Status: SHIPPED | OUTPATIENT
Start: 2023-04-19

## 2023-04-19 RX ORDER — PROCHLORPERAZINE EDISYLATE 5 MG/ML
5 INJECTION INTRAMUSCULAR; INTRAVENOUS EVERY 30 MIN PRN
Status: DISCONTINUED | OUTPATIENT
Start: 2023-04-19 | End: 2023-04-19 | Stop reason: HOSPADM

## 2023-04-19 RX ORDER — HYDROCODONE BITARTRATE AND ACETAMINOPHEN 10; 325 MG/1; MG/1
1 TABLET ORAL EVERY 4 HOURS PRN
Status: CANCELLED | OUTPATIENT
Start: 2023-04-19

## 2023-04-19 RX ORDER — LIDOCAINE HYDROCHLORIDE 10 MG/ML
0.5 INJECTION, SOLUTION EPIDURAL; INFILTRATION; INTRACAUDAL; PERINEURAL ONCE
Status: DISCONTINUED | OUTPATIENT
Start: 2023-04-19 | End: 2023-04-19 | Stop reason: HOSPADM

## 2023-04-19 RX ORDER — DEXAMETHASONE SODIUM PHOSPHATE 4 MG/ML
INJECTION, SOLUTION INTRA-ARTICULAR; INTRALESIONAL; INTRAMUSCULAR; INTRAVENOUS; SOFT TISSUE
Status: DISCONTINUED | OUTPATIENT
Start: 2023-04-19 | End: 2023-04-19

## 2023-04-19 RX ORDER — MEPERIDINE HYDROCHLORIDE 25 MG/ML
12.5 INJECTION INTRAMUSCULAR; INTRAVENOUS; SUBCUTANEOUS ONCE AS NEEDED
Status: DISCONTINUED | OUTPATIENT
Start: 2023-04-19 | End: 2023-04-19 | Stop reason: HOSPADM

## 2023-04-19 RX ORDER — SODIUM CHLORIDE, SODIUM LACTATE, POTASSIUM CHLORIDE, CALCIUM CHLORIDE 600; 310; 30; 20 MG/100ML; MG/100ML; MG/100ML; MG/100ML
INJECTION, SOLUTION INTRAVENOUS CONTINUOUS
Status: DISCONTINUED | OUTPATIENT
Start: 2023-04-19 | End: 2023-04-19 | Stop reason: HOSPADM

## 2023-04-19 RX ORDER — PROPOFOL 10 MG/ML
VIAL (ML) INTRAVENOUS
Status: DISCONTINUED | OUTPATIENT
Start: 2023-04-19 | End: 2023-04-19

## 2023-04-19 RX ORDER — HYDROMORPHONE HYDROCHLORIDE 2 MG/ML
0.4 INJECTION, SOLUTION INTRAMUSCULAR; INTRAVENOUS; SUBCUTANEOUS EVERY 5 MIN PRN
Status: DISCONTINUED | OUTPATIENT
Start: 2023-04-19 | End: 2023-04-19 | Stop reason: HOSPADM

## 2023-04-19 RX ORDER — ONDANSETRON 2 MG/ML
INJECTION INTRAMUSCULAR; INTRAVENOUS
Status: DISCONTINUED | OUTPATIENT
Start: 2023-04-19 | End: 2023-04-19

## 2023-04-19 RX ORDER — OXYCODONE HYDROCHLORIDE 5 MG/1
5 TABLET ORAL
Status: DISCONTINUED | OUTPATIENT
Start: 2023-04-19 | End: 2023-04-19 | Stop reason: HOSPADM

## 2023-04-19 RX ORDER — OXYCODONE AND ACETAMINOPHEN 5; 325 MG/1; MG/1
1 TABLET ORAL EVERY 4 HOURS PRN
Qty: 10 TABLET | Refills: 0 | Status: SHIPPED | OUTPATIENT
Start: 2023-04-19

## 2023-04-19 RX ORDER — LIDOCAINE HYDROCHLORIDE 20 MG/ML
INJECTION INTRAVENOUS
Status: DISCONTINUED | OUTPATIENT
Start: 2023-04-19 | End: 2023-04-19

## 2023-04-19 RX ORDER — SODIUM CHLORIDE 0.9 % (FLUSH) 0.9 %
3 SYRINGE (ML) INJECTION
Status: DISCONTINUED | OUTPATIENT
Start: 2023-04-19 | End: 2023-04-19 | Stop reason: HOSPADM

## 2023-04-19 RX ORDER — ONDANSETRON 8 MG/1
8 TABLET, ORALLY DISINTEGRATING ORAL EVERY 8 HOURS PRN
Status: DISCONTINUED | OUTPATIENT
Start: 2023-04-19 | End: 2023-04-19 | Stop reason: HOSPADM

## 2023-04-19 RX ORDER — KETOROLAC TROMETHAMINE 30 MG/ML
INJECTION, SOLUTION INTRAMUSCULAR; INTRAVENOUS
Status: DISCONTINUED | OUTPATIENT
Start: 2023-04-19 | End: 2023-04-19

## 2023-04-19 RX ORDER — FENTANYL CITRATE 50 UG/ML
INJECTION, SOLUTION INTRAMUSCULAR; INTRAVENOUS
Status: DISCONTINUED | OUTPATIENT
Start: 2023-04-19 | End: 2023-04-19

## 2023-04-19 RX ORDER — DIPHENHYDRAMINE HYDROCHLORIDE 50 MG/ML
25 INJECTION INTRAMUSCULAR; INTRAVENOUS EVERY 4 HOURS PRN
Status: CANCELLED | OUTPATIENT
Start: 2023-04-19

## 2023-04-19 RX ORDER — PHENYLEPHRINE HYDROCHLORIDE 10 MG/ML
INJECTION INTRAVENOUS
Status: DISCONTINUED | OUTPATIENT
Start: 2023-04-19 | End: 2023-04-19

## 2023-04-19 RX ORDER — PROCHLORPERAZINE EDISYLATE 5 MG/ML
5 INJECTION INTRAMUSCULAR; INTRAVENOUS EVERY 6 HOURS PRN
Status: DISCONTINUED | OUTPATIENT
Start: 2023-04-19 | End: 2023-04-19 | Stop reason: HOSPADM

## 2023-04-19 RX ORDER — HYDROCODONE BITARTRATE AND ACETAMINOPHEN 5; 325 MG/1; MG/1
1 TABLET ORAL EVERY 4 HOURS PRN
Status: CANCELLED | OUTPATIENT
Start: 2023-04-19

## 2023-04-19 RX ADMIN — PROPOFOL 180 MG: 10 INJECTION, EMULSION INTRAVENOUS at 12:04

## 2023-04-19 RX ADMIN — LIDOCAINE HYDROCHLORIDE 50 MG: 20 INJECTION, SOLUTION INTRAVENOUS at 12:04

## 2023-04-19 RX ADMIN — DEXAMETHASONE SODIUM PHOSPHATE 8 MG: 4 INJECTION, SOLUTION INTRAMUSCULAR; INTRAVENOUS at 12:04

## 2023-04-19 RX ADMIN — ONDANSETRON HYDROCHLORIDE 4 MG: 2 INJECTION INTRAMUSCULAR; INTRAVENOUS at 12:04

## 2023-04-19 RX ADMIN — KETOROLAC TROMETHAMINE 30 MG: 30 INJECTION, SOLUTION INTRAMUSCULAR; INTRAVENOUS at 12:04

## 2023-04-19 RX ADMIN — OXYCODONE HYDROCHLORIDE 5 MG: 5 TABLET ORAL at 01:04

## 2023-04-19 RX ADMIN — SODIUM CHLORIDE, SODIUM LACTATE, POTASSIUM CHLORIDE, AND CALCIUM CHLORIDE: 600; 310; 30; 20 INJECTION, SOLUTION INTRAVENOUS at 11:04

## 2023-04-19 RX ADMIN — GLYCOPYRROLATE 0.2 MG: 0.2 INJECTION, SOLUTION INTRAMUSCULAR; INTRAVITREAL at 12:04

## 2023-04-19 RX ADMIN — CARBOXYMETHYLCELLULOSE SODIUM 2 DROP: 2.5 SOLUTION/ DROPS OPHTHALMIC at 12:04

## 2023-04-19 RX ADMIN — PHENYLEPHRINE HYDROCHLORIDE 200 MCG: 10 INJECTION INTRAVENOUS at 12:04

## 2023-04-19 RX ADMIN — FENTANYL CITRATE 100 MCG: 50 INJECTION, SOLUTION INTRAMUSCULAR; INTRAVENOUS at 12:04

## 2023-04-19 NOTE — TRANSFER OF CARE
Anesthesia Transfer of Care Note    Patient: Radha Mario    Procedure(s) Performed: Procedure(s) (LRB):  CONE BIOPSY, CERVIX, USING COLD KNIFE (N/A)    Patient location: PACU    Anesthesia Type: general    Transport from OR: Transported from OR on 2-3 L/min O2 by NC with adequate spontaneous ventilation    Post pain: adequate analgesia    Post assessment: no apparent anesthetic complications    Post vital signs: stable    Level of consciousness: awake and alert    Nausea/Vomiting: no nausea/vomiting    Complications: none    Transfer of care protocol was followed      Last vitals:   Visit Vitals  BP (!) 114/53 (BP Location: Right arm, Patient Position: Lying)   Pulse 82   Temp 36.3 °C (97.3 °F) (Skin)   Resp 18   LMP  (LMP Unknown)   SpO2 100%   Breastfeeding No

## 2023-04-19 NOTE — ANESTHESIA PROCEDURE NOTES
Intubation    Date/Time: 4/19/2023 12:09 PM  Performed by: Lita Ortiz CRNA  Authorized by: Kenan Mercer MD     Intubation:     Induction:  Intravenous    Intubated:  Postinduction    Mask Ventilation:  Easy mask    Attempts:  1    Attempted By:  CRNA    Difficult Airway Encountered?: No      Complications:  None    Airway Device:  Supraglottic airway/LMA    Airway Device Size:  4.0    Secured at:  The lips    Placement Verified By:  Capnometry    Complicating Factors:  None    Findings Post-Intubation:  Atraumatic/condition of teeth unchanged

## 2023-04-19 NOTE — CHAPLAIN
04/19/23 1556   Clinical Encounter Type   Visit Type Initial Visit   Visit Category Pre-Op   Visited With Patient and family together   Number of Family Visited 1   Length of Visit 10 Minutes   Patient Spiritual Encounters   Care Provided Reflective listening;Compassionate presence   Patient Coping Accepting   Comments - Patient pt thanked me for visiting and denied spiritual needs at this time   Family Spiritual Encounters   Care Provided Compassionate presence

## 2023-04-19 NOTE — ANESTHESIA POSTPROCEDURE EVALUATION
Anesthesia Post Evaluation    Patient: Radha Ritchiescdaniel    Procedure(s) Performed: Procedure(s) (LRB):  CONE BIOPSY, CERVIX, USING COLD KNIFE (N/A)    Final Anesthesia Type: general      Patient location during evaluation: PACU  Patient participation: Yes- Able to Participate  Level of consciousness: awake and alert  Post-procedure vital signs: reviewed and stable  Pain management: adequate  Airway patency: patent  REGINALDO mitigation strategies: Extubation while patient is awake  PONV status at discharge: No PONV  Anesthetic complications: no      Cardiovascular status: hemodynamically stable  Respiratory status: unassisted  Hydration status: euvolemic  Follow-up not needed.          Vitals Value Taken Time   /53 04/19/23 1244   Temp 36.3 °C (97.3 °F) 04/19/23 1244   Pulse 82 04/19/23 1244   Resp 18 04/19/23 1244   SpO2 100 % 04/19/23 1244         No case tracking events are documented in the log.      Pain/Christiano Score: Christiano Score: 10 (4/19/2023 12:44 PM)

## 2023-04-19 NOTE — BRIEF OP NOTE
[facility] - Surgery ([place])  Brief Operative Note    Surgery Date: 4/19/2023     Surgeon(s) and Role:     * Татьяна Motta MD - Primary    Assisting Surgeon: None    Pre-op Diagnosis:  High grade squamous intraepithelial lesion (HGSIL), grade 3 MIGEL, on biopsy of cervix [D06.9]    Post-op Diagnosis:  Post-Op Diagnosis Codes:     * High grade squamous intraepithelial lesion (HGSIL), grade 3 MIGEL, on biopsy of cervix [D06.9]    Procedure(s) (LRB):  CONE BIOPSY, CERVIX, USING COLD KNIFE (N/A)    Anesthesia: General    Operative Findings: transformation completely visualized.    Estimated Blood Loss: 30 mL         Specimens:   Specimen (24h ago, onward)       Start     Ordered    04/19/23 1229  Specimen to Pathology, Surgery Gynecology and Obstetrics  Once        Comments: Pre-op Diagnosis: High grade squamous intraepithelial lesion (HGSIL), grade 3 MIGEL, on biopsy of cervix [D06.9]Procedure(s):CONE BIOPSY, CERVIX, USING COLD KNIFE Number of specimens: 2Name of specimens: 1.) Cervical cone biopsy, suture at 12:002.) Post [facility]     References:    Click here for ordering Quick Tip   Question Answer Comment   Procedure Type: Gynecology and Obstetrics    Specimen Class: Routine/Screening    Which provider would you like to cc? ТАТЬЯНА MOTTA    Release to patient Immediate        04/19/23 1229    04/19/23 1225  Specimen to Pathology, Surgery Gynecology and Obstetrics  Once,   Status:  Canceled        Comments: Pre-op Diagnosis: High grade squamous intraepithelial lesion (HGSIL), grade 3 MIGEL, on biopsy of cervix [D06.9]Procedure(s):CONE BIOPSY, CERVIX, USING COLD KNIFE Number of specimens: 2Name of specimens: 1.) Cervical cone biopsy2.) Post [facility]     References:    Click here for ordering Quick Tip   Question Answer Comment   Procedure Type: Gynecology and Obstetrics    Specimen Class: Routine/Screening    Which provider would you like to cc? ТАТЬЯНА MOTTA    Release to patient Immediate         04/19/23 1225                    Underwent above procedure without difficulty. Tolerated well. Transferred to PACU in stable condition. Uncomplicated postop course. Discharged home in stable condition.    Discharge Note    OUTCOME: Patient tolerated treatment/procedure well without complication and is now ready for discharge.    DISPOSITION: Home or Self Care    FINAL DIAGNOSIS:  High grade squamous intraepithelial lesion (HGSIL), grade 3 MIGEL, on biopsy of cervix    FOLLOWUP: In clinic    DISCHARGE INSTRUCTIONS:    Discharge Procedure Orders   Diet general     Call MD for:  temperature >100.4     Call MD for:  persistent nausea and vomiting     Call MD for:  severe uncontrolled pain

## 2023-04-19 NOTE — OP NOTE
[facility]  Gynecology  Operative Note    SUMMARY     Date of Procedure: 4/19/2023     Procedure: Procedure(s) (LRB):  CONE BIOPSY, CERVIX, USING COLD KNIFE (N/A)       Surgeon(s) and Role:     * Татьяна Motta MD - Primary    Assisting Surgeon: None    Pre-Operative Diagnosis: High grade squamous intraepithelial lesion (HGSIL), grade 3 MIGEL, on biopsy of cervix [D06.9]    Post-Operative Diagnosis: Post-Op Diagnosis Codes:     * High grade squamous intraepithelial lesion (HGSIL), grade 3 MIGEL, on biopsy of cervix [D06.9]    Anesthesia: General    Description of the Findings of the Procedure: entire transformation zone visualized    Complications: No    Estimated Blood Loss (EBL): 30 mL           Implants: * No implants in log *    Specimens:   Specimen (24h ago, onward)       Start     Ordered    04/19/23 1229  Specimen to Pathology, Surgery Gynecology and Obstetrics  Once        Comments: Pre-op Diagnosis: High grade squamous intraepithelial lesion (HGSIL), grade 3 MIGEL, on biopsy of cervix [D06.9]Procedure(s):CONE BIOPSY, CERVIX, USING COLD KNIFE Number of specimens: 2Name of specimens: 1.) Cervical cone biopsy, suture [facility] 12:002.) [facility]     References:    Click here for ordering Quick Tip   Question Answer Comment   Procedure Type: Gynecology and Obstetrics    Specimen Class: Routine/Screening    Which provider would you like to cc? ТАТЬЯНА MOTTA    Release to patient Immediate        04/19/23 1229    04/19/23 1225  Specimen to Pathology, Surgery Gynecology and Obstetrics  Once,   Status:  Canceled        Comments: Pre-op Diagnosis: High grade squamous intraepithelial lesion (HGSIL), grade 3 MIGEL, on biopsy of cervix [D06.9]Procedure(s):CONE BIOPSY, CERVIX, USING COLD KNIFE Number of specimens: 2Name of specimens: 1.) Cervical cone biopsy2.) Post [facility]     References:    Click here for ordering Quick Tip   Question Answer Comment   Procedure Type: Gynecology and Obstetrics     Specimen Class: Routine/Screening    Which provider would you like to cc? ARCELIA LEARY    Release to patient Immediate        04/19/23 0868                            Condition: Good    Disposition: PACU - hemodynamically stable.    Attestation: A qualified resident physician was not available.    Procedure in detail:    Patient was taken to the operating room where general anesthesia was started without difficulty. A time out was taken.   She was placed in the dorsal lithotomy position. Her vagina was prepped and draped in the usual sterile fashion. A straight catheter was used to drain the bladder.    A weighted speculum was placed in the vagina. A single tooth tenaculum was used to grasp the anterior lip of the cervix. Hemostatic sutures of 2-0 chromic were placed at the 3 and 9 o'clock positions on the cervix; these were tied down. Lugol's solution was applied to the cervix and a nonstained area was seen that encompassed the transformation zone. A scalpel was used to remove a cone-shaped piece of ectocervix, taking care to include the transformation zone. A post-[facility] ECC was performed. Excellent hemostasis was obtained by using 2-0 chromic figure of eight sutures and bovie cautery. Monsel's solution applied to base. Excellent hemostasis was confirmed. The tenaculum was removed. Hemostasis was noted at the tenaculum sites. Excellent hemostasis was noted.    The weighted speculum was removed. The patient was placed in the supine position and awakened. All counts were correct x 2. The patient was taken to the PACU in stable condition.

## 2023-04-20 VITALS
TEMPERATURE: 98 F | RESPIRATION RATE: 18 BRPM | HEART RATE: 76 BPM | SYSTOLIC BLOOD PRESSURE: 110 MMHG | DIASTOLIC BLOOD PRESSURE: 54 MMHG | OXYGEN SATURATION: 100 %

## 2023-04-25 ENCOUNTER — TELEPHONE (OUTPATIENT)
Dept: HEMATOLOGY/ONCOLOGY | Facility: CLINIC | Age: 58
End: 2023-04-25
Payer: COMMERCIAL

## 2023-04-25 LAB
GENETIC COUNSELING?: YES
GENSO SPECIMEN TYPE: NORMAL
MISCELLANEOUS GENETIC TEST NAME: NORMAL
PARTENTAL OR SIBLING TESTING?: NO
REFERENCE LAB: NORMAL
TEST RESULT: NORMAL

## 2023-04-25 NOTE — TELEPHONE ENCOUNTER
Impression    Ms. Mario's panel genetic testing was negative for actionable mutations in 23 genes associated with hereditary breast, gastrointestinal and gynecologic cancers (including the likely somatic CHEK2 mutation found in her mother). Results were disclosed over the phone on 4/25/23.    Discussion    Genetic Test Results    Ms. Mario had a sample submitted on 3/30/23 to [facility] for [facility]CANext-Expanded +RNAinsight panel testing. This panel includes sequencing and/or deletion/duplication analysis of the following 23 genes:    ANISA, BARD1, BRCA1, BRCA2, BRIP1, CDH1, CHEK2, DICER1, EPCAM,  MLH1, MSH2, MSH6, NBN, NF1, PALB2, PMS2, PTEN, RAD51C, RAD51D, RECQL, SMARCA4, STK11, TP53    The results were negative for actionable mutations in any of these genes. This is considered a negative result, but it does not completely rule out a hereditary form of cancer in her family. Some individuals/families with cancer may have a mutation in a gene that is not included in this panel, and some may have mutations in one of these genes that is not detectable with our current technology. It could also be that Ms. Mario's personal and family history of cancer is not due to a hereditary cause.     Additionally, Ms. Mario specifically tested negative for the likely pathogenic CHEK2 mutation ([id number]) found upon her mother's panel genetic testing through [facility]. Given Ms. 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     Given Ms. Mario's negative results, the likelihood of a hereditary form of cancer has been drastically reduced for Ms. Mario and her family. She has undergone comprehensive hereditary breast/GYN cancer genetic testing, and no further genetic testing is recommended at this time.    Cancer Risks    Ms. Mario should continue to be  followed per her providers based on her history of DCIS. She should also pursue other cancer screenings, including colonoscopy and Pap smear per gastroenterologist and gynecologist recommendations, respectively.    Family Members    Ms. Mario's close female relatives may still be at increased risk of breast cancer given the number of relatives with breast cancer. Specifically, her daughters may wish to consider a consult with a high- to discuss their breast cancer risks and appropriate screening (breast MRI dependent on Tyrer-Cuzick risk score). However, it is not recommended for unaffected individuals to undergo genetic testing at this time.    Ms. Mario received comprehensive genetic counseling regarding her negative genetic test results. Benefits and limitations were discussed, and she was provided with an electronic copy of her results report. Ms. Mario is encouraged to contact cancer genetics if there are any updates to her personal or family history, or if she has any questions or concerns.    This assessment is based on the history and reports provided, as well as the current scientific knowledge regarding cancer genetics

## 2023-04-26 ENCOUNTER — PATIENT MESSAGE (OUTPATIENT)
Dept: OBSTETRICS AND GYNECOLOGY | Facility: CLINIC | Age: 58
End: 2023-04-26
Payer: COMMERCIAL

## 2023-04-26 LAB
FINAL PATHOLOGIC DIAGNOSIS: NORMAL
Lab: NORMAL

## 2023-05-03 ENCOUNTER — OFFICE VISIT (OUTPATIENT)
Dept: OBSTETRICS AND GYNECOLOGY | Facility: CLINIC | Age: 58
End: 2023-05-03
Attending: OBSTETRICS & GYNECOLOGY
Payer: COMMERCIAL

## 2023-05-03 ENCOUNTER — LAB VISIT (OUTPATIENT)
Dept: LAB | Facility: OTHER | Age: 58
End: 2023-05-03
Attending: INTERNAL MEDICINE
Payer: COMMERCIAL

## 2023-05-03 VITALS
HEIGHT: 67 IN | BODY MASS INDEX: 22.2 KG/M2 | WEIGHT: 141.44 LBS | SYSTOLIC BLOOD PRESSURE: 108 MMHG | DIASTOLIC BLOOD PRESSURE: 62 MMHG

## 2023-05-03 DIAGNOSIS — Z09 POSTOP CHECK: Primary | ICD-10-CM

## 2023-05-03 DIAGNOSIS — R89.9 ABNORMAL LABORATORY TEST: ICD-10-CM

## 2023-05-03 LAB
CHOLEST SERPL-MCNC: 205 MG/DL (ref 120–199)
CHOLEST/HDLC SERPL: 3.8 {RATIO} (ref 2–5)
HDLC SERPL-MCNC: 54 MG/DL (ref 40–75)
HDLC SERPL: 26.3 % (ref 20–50)
LDLC SERPL CALC-MCNC: 126.8 MG/DL (ref 63–159)
NONHDLC SERPL-MCNC: 151 MG/DL
TRIGL SERPL-MCNC: 121 MG/DL (ref 30–150)

## 2023-05-03 PROCEDURE — 36415 COLL VENOUS BLD VENIPUNCTURE: CPT | Performed by: INTERNAL MEDICINE

## 2023-05-03 PROCEDURE — 3008F BODY MASS INDEX DOCD: CPT | Mod: CPTII,S$GLB,, | Performed by: OBSTETRICS & GYNECOLOGY

## 2023-05-03 PROCEDURE — 99999 PR PBB SHADOW E&M-EST. PATIENT-LVL III: ICD-10-PCS | Mod: PBBFAC,,, | Performed by: OBSTETRICS & GYNECOLOGY

## 2023-05-03 PROCEDURE — 3008F PR BODY MASS INDEX (BMI) DOCUMENTED: ICD-10-PCS | Mod: CPTII,S$GLB,, | Performed by: OBSTETRICS & GYNECOLOGY

## 2023-05-03 PROCEDURE — 99024 PR POST-OP FOLLOW-UP VISIT: ICD-10-PCS | Mod: S$GLB,,, | Performed by: OBSTETRICS & GYNECOLOGY

## 2023-05-03 PROCEDURE — 1159F PR MEDICATION LIST DOCUMENTED IN MEDICAL RECORD: ICD-10-PCS | Mod: CPTII,S$GLB,, | Performed by: OBSTETRICS & GYNECOLOGY

## 2023-05-03 PROCEDURE — 80061 LIPID PANEL: CPT | Performed by: INTERNAL MEDICINE

## 2023-05-03 PROCEDURE — 99999 PR PBB SHADOW E&M-EST. PATIENT-LVL III: CPT | Mod: PBBFAC,,, | Performed by: OBSTETRICS & GYNECOLOGY

## 2023-05-03 PROCEDURE — 3074F PR MOST RECENT SYSTOLIC BLOOD PRESSURE < 130 MM HG: ICD-10-PCS | Mod: CPTII,S$GLB,, | Performed by: OBSTETRICS & GYNECOLOGY

## 2023-05-03 PROCEDURE — 3078F DIAST BP <80 MM HG: CPT | Mod: CPTII,S$GLB,, | Performed by: OBSTETRICS & GYNECOLOGY

## 2023-05-03 PROCEDURE — 99024 POSTOP FOLLOW-UP VISIT: CPT | Mod: S$GLB,,, | Performed by: OBSTETRICS & GYNECOLOGY

## 2023-05-03 PROCEDURE — 3074F SYST BP LT 130 MM HG: CPT | Mod: CPTII,S$GLB,, | Performed by: OBSTETRICS & GYNECOLOGY

## 2023-05-03 PROCEDURE — 1159F MED LIST DOCD IN RCRD: CPT | Mod: CPTII,S$GLB,, | Performed by: OBSTETRICS & GYNECOLOGY

## 2023-05-03 PROCEDURE — 3078F PR MOST RECENT DIASTOLIC BLOOD PRESSURE < 80 MM HG: ICD-10-PCS | Mod: CPTII,S$GLB,, | Performed by: OBSTETRICS & GYNECOLOGY

## 2023-05-03 NOTE — PROGRESS NOTES
"56 yo female presents for postoperative visit s/p [facility] on 4/19. She denies complaints.     Gen: AAO x 3, NAD  Vitals:    05/03/23 0930   BP: 108/62   Weight: 64.1 kg (141 lb 6.8 oz)   Height: 5' 7" (1.702 m)   PainSc: 0-No pain     Pelvic: cervix well-healed, os patent with cytobrush, normal bimanual exam, no adnexal masses, nontender.    Reviewed pathology.    A/P postoperative visit  - f/u for annual visit.     "

## 2024-01-05 ENCOUNTER — TELEPHONE (OUTPATIENT)
Dept: PULMONOLOGY | Facility: CLINIC | Age: 59
End: 2024-01-05
Payer: COMMERCIAL

## 2024-02-15 ENCOUNTER — PATIENT MESSAGE (OUTPATIENT)
Dept: OBSTETRICS AND GYNECOLOGY | Facility: CLINIC | Age: 59
End: 2024-02-15
Payer: COMMERCIAL

## 2024-02-15 DIAGNOSIS — Z12.31 ENCOUNTER FOR SCREENING MAMMOGRAM FOR BREAST CANCER: Primary | ICD-10-CM

## 2024-03-06 RX ORDER — RALOXIFENE HYDROCHLORIDE 60 MG/1
60 TABLET, FILM COATED ORAL
Qty: 90 TABLET | Refills: 0 | Status: SHIPPED | OUTPATIENT
Start: 2024-03-06 | End: 2024-05-31

## 2024-05-31 RX ORDER — RALOXIFENE HYDROCHLORIDE 60 MG/1
60 TABLET, FILM COATED ORAL
Qty: 90 TABLET | Refills: 0 | Status: SHIPPED | OUTPATIENT
Start: 2024-05-31

## 2024-06-24 ENCOUNTER — OFFICE VISIT (OUTPATIENT)
Dept: OBSTETRICS AND GYNECOLOGY | Facility: CLINIC | Age: 59
End: 2024-06-24
Payer: COMMERCIAL

## 2024-06-24 VITALS
SYSTOLIC BLOOD PRESSURE: 106 MMHG | WEIGHT: 141.13 LBS | DIASTOLIC BLOOD PRESSURE: 72 MMHG | BODY MASS INDEX: 22.15 KG/M2 | HEIGHT: 67 IN

## 2024-06-24 DIAGNOSIS — Z11.51 SCREENING FOR HPV (HUMAN PAPILLOMAVIRUS): ICD-10-CM

## 2024-06-24 DIAGNOSIS — Z12.4 SCREENING FOR CERVICAL CANCER: ICD-10-CM

## 2024-06-24 DIAGNOSIS — Z98.890 HISTORY OF CONE BIOPSY OF CERVIX: ICD-10-CM

## 2024-06-24 DIAGNOSIS — Z01.419 ENCOUNTER FOR GYNECOLOGICAL EXAMINATION: Primary | ICD-10-CM

## 2024-06-24 PROCEDURE — 99396 PREV VISIT EST AGE 40-64: CPT | Mod: S$GLB,,, | Performed by: OBSTETRICS & GYNECOLOGY

## 2024-06-24 PROCEDURE — 99459 PELVIC EXAMINATION: CPT | Mod: S$GLB,,, | Performed by: OBSTETRICS & GYNECOLOGY

## 2024-06-24 PROCEDURE — 3074F SYST BP LT 130 MM HG: CPT | Mod: CPTII,S$GLB,, | Performed by: OBSTETRICS & GYNECOLOGY

## 2024-06-24 PROCEDURE — 3078F DIAST BP <80 MM HG: CPT | Mod: CPTII,S$GLB,, | Performed by: OBSTETRICS & GYNECOLOGY

## 2024-06-24 PROCEDURE — 1159F MED LIST DOCD IN RCRD: CPT | Mod: CPTII,S$GLB,, | Performed by: OBSTETRICS & GYNECOLOGY

## 2024-06-24 PROCEDURE — 87624 HPV HI-RISK TYP POOLED RSLT: CPT | Performed by: OBSTETRICS & GYNECOLOGY

## 2024-06-24 PROCEDURE — 3008F BODY MASS INDEX DOCD: CPT | Mod: CPTII,S$GLB,, | Performed by: OBSTETRICS & GYNECOLOGY

## 2024-06-24 PROCEDURE — 99999 PR PBB SHADOW E&M-EST. PATIENT-LVL III: CPT | Mod: PBBFAC,,, | Performed by: OBSTETRICS & GYNECOLOGY

## 2024-06-24 NOTE — PROGRESS NOTES
"Subjective:       Patient ID: Radha Mario is a 58 y.o. female.    Chief Complaint:  Annual Exam (Last pap/hpv: 3/2023 HGSIL HPV OTHER + ; ECC: Migel 3, Colpo: Migel: 2-3 ; CONE: MIGEL 1/Last mmg: 3/1/2024 Birads: 2 )      History of Present Illness  - here for annual. No complaints.    Past Medical History:   Diagnosis Date    Abnormal Pap smear of cervix 2018    Hpv +      Anxiety     Breast cancer     Left Lumpectomy     Menopause        Past Surgical History:   Procedure Laterality Date    AUGMENTATION OF BREAST      BREAST LUMPECTOMY      then radiation    COLD KNIFE CONIZATION OF CERVIX N/A 2023    Procedure: CONE BIOPSY, CERVIX, USING COLD KNIFE;  Surgeon: Татьяна Motta MD;  [facility]: [facility];  Service: OB/GYN;  Laterality: N/A;        Family History   Problem Relation Name Age of Onset    Stroke Father      Hypertension Mother      Breast cancer Mother      Colon cancer Neg Hx      Diabetes Neg Hx      Ovarian cancer Neg Hx          Social History     Socioeconomic History    Marital status:    Tobacco Use    Smoking status: Former     Current packs/day: 0.00     Types: Cigarettes     Quit date: 2023     Years since quittin.3    Smokeless tobacco: Never   Substance and Sexual Activity    Alcohol use: Yes     Comment: socailly     Drug use: No    Sexual activity: Yes     Partners: Male     Birth control/protection: None, Post-menopausal     Comment: :                   Objective:     Vitals:    24 1338   BP: 106/72   Weight: 64 kg (141 lb 1.5 oz)   Height: 5' 7" (1.702 m)       Physical Exam:   Constitutional: She is oriented to person, place, and time. She appears well-developed and well-nourished.        Pulmonary/Chest: Right breast exhibits augmentation. Right breast exhibits no mass, no nipple discharge, no skin change, no tenderness and no swelling. Left breast exhibits augmentation and lumpectomy. Left breast exhibits no mass, no " nipple discharge, no skin change, no tenderness and no swelling. Breasts are symmetrical.        Abdominal: Soft. She exhibits no distension. There is no abdominal tenderness.     Genitourinary:    Vagina and uterus normal.   There is no tenderness or lesion on the right labia. There is no tenderness or lesion on the left labia. Cervix is normal. Right adnexum displays no mass, no tenderness and no fullness. Left adnexum displays no mass, no tenderness and no fullness. No vaginal discharge in the vagina.    pap smear completed   Genitourinary Comments: Shortened cervix c/w surgical history             Musculoskeletal: Moves all extremeties.       Neurological: She is alert and oriented to person, place, and time.     Psychiatric: She has a normal mood and affect.        A female chaperone was present for exam.    Assessment/ Plan:     Orders Placed This Encounter    HPV High Risk Genotypes, PCR    Liquid-Based Pap Smear, Screening       Radha was seen today for annual exam.    Diagnoses and all orders for this visit:    Encounter for gynecological examination    Screening for HPV (human papillomavirus)  -     HPV High Risk Genotypes, PCR    Screening for cervical cancer  -     Liquid-Based Pap Smear, Screening    History of cone biopsy of cervix  -     Liquid-Based Pap Smear, Screening  -     HPV High Risk Genotypes, PCR        Follow up in about 1 year (around 6/24/2025) for annual exam.    As of April 1, 2021, the Cures Act has been passed nationally. This new law requires that all doctors progress notes, lab results, pathology reports and radiology reports be released IMMEDIATELY to the patient in the patient portal. That means that the results are released to you at the EXACT same time they are released to me. Therefore, with all of the patients that I have I am not able to reply to each patient exactly when the results come in. So there will be a delay from when you see the results to when I see them and have  time to come up with a response to send you. Also I only see these results when I am on the computer at work. So if the results come in over the weekend or after 5 pm of a work day, I will not see them until the next business day. As you can tell, this is a challenge as a physician to give every patient the quick response they hope for and deserve. So please be patient!   Thanks for your understanding and patience.

## 2024-08-31 NOTE — TELEPHONE ENCOUNTER
Refill Routing Note   Medication(s) are not appropriate for processing by [facility] for the following reason(s):        Outside of protocol  Required labs outdated    [facility] action(s):  Route               Appointments  past 12m or future 3m with PCP    Date Provider   Last Visit   Visit date not found Tonio Watson MD   Next Visit   Visit date not found Tonio Watson MD   ED visits in past 90 days: 0        Note composed:8:58 AM 08/31/2024

## 2024-09-02 RX ORDER — RALOXIFENE HYDROCHLORIDE 60 MG/1
60 TABLET, FILM COATED ORAL
Qty: 30 TABLET | Refills: 0 | Status: SHIPPED | OUTPATIENT
Start: 2024-09-02

## 2024-09-25 ENCOUNTER — TELEPHONE (OUTPATIENT)
Dept: OBSTETRICS AND GYNECOLOGY | Facility: CLINIC | Age: 59
End: 2024-09-25
Payer: COMMERCIAL

## 2024-09-25 RX ORDER — RALOXIFENE HYDROCHLORIDE 60 MG/1
60 TABLET, FILM COATED ORAL
Qty: 90 TABLET | Refills: 1 | Status: SHIPPED | OUTPATIENT
Start: 2024-09-25 | End: 2024-09-25 | Stop reason: SDUPTHER

## 2024-09-25 RX ORDER — RALOXIFENE HYDROCHLORIDE 60 MG/1
60 TABLET, FILM COATED ORAL DAILY
Qty: 90 TABLET | Refills: 1 | Status: SHIPPED | OUTPATIENT
Start: 2024-09-25

## 2025-03-19 ENCOUNTER — PATIENT MESSAGE (OUTPATIENT)
Dept: OBSTETRICS AND GYNECOLOGY | Facility: CLINIC | Age: 60
End: 2025-03-19
Payer: COMMERCIAL

## 2025-03-19 DIAGNOSIS — Z12.31 SCREENING MAMMOGRAM FOR BREAST CANCER: Primary | ICD-10-CM

## 2025-04-01 RX ORDER — RALOXIFENE HYDROCHLORIDE 60 MG/1
60 TABLET, FILM COATED ORAL
Qty: 90 TABLET | Refills: 0 | Status: SHIPPED | OUTPATIENT
Start: 2025-04-01

## 2025-04-02 ENCOUNTER — RESULTS FOLLOW-UP (OUTPATIENT)
Dept: OBSTETRICS AND GYNECOLOGY | Facility: CLINIC | Age: 60
End: 2025-04-02

## 2025-05-02 ENCOUNTER — LAB VISIT (OUTPATIENT)
Dept: LAB | Facility: HOSPITAL | Age: 60
End: 2025-05-02
Payer: COMMERCIAL

## 2025-05-02 DIAGNOSIS — Z13.1 SCREENING FOR DIABETES MELLITUS: ICD-10-CM

## 2025-05-02 DIAGNOSIS — F17.200 TOBACCO DEPENDENCE: ICD-10-CM

## 2025-05-02 DIAGNOSIS — E78.2 MIXED HYPERLIPIDEMIA: ICD-10-CM

## 2025-05-02 LAB
ABSOLUTE EOSINOPHIL (OHS): 0.13 K/UL
ABSOLUTE MONOCYTE (OHS): 0.67 K/UL (ref 0.3–1)
ABSOLUTE NEUTROPHIL COUNT (OHS): 5.66 K/UL (ref 1.8–7.7)
ALBUMIN SERPL BCP-MCNC: 3.5 G/DL (ref 3.5–5.2)
ALP SERPL-CCNC: 56 UNIT/L (ref 40–150)
ALT SERPL W/O P-5'-P-CCNC: 16 UNIT/L (ref 10–44)
ANION GAP (OHS): 7 MMOL/L (ref 8–16)
AST SERPL-CCNC: 20 UNIT/L (ref 11–45)
BASOPHILS # BLD AUTO: 0.07 K/UL
BASOPHILS NFR BLD AUTO: 0.8 %
BILIRUB SERPL-MCNC: 0.3 MG/DL (ref 0.1–1)
BUN SERPL-MCNC: 14 MG/DL (ref 6–20)
CALCIUM SERPL-MCNC: 9 MG/DL (ref 8.7–10.5)
CHLORIDE SERPL-SCNC: 108 MMOL/L (ref 95–110)
CHOLEST SERPL-MCNC: 258 MG/DL (ref 120–199)
CHOLEST/HDLC SERPL: 4.6 {RATIO} (ref 2–5)
CO2 SERPL-SCNC: 25 MMOL/L (ref 23–29)
CREAT SERPL-MCNC: 0.7 MG/DL (ref 0.5–1.4)
EAG (OHS): 114 MG/DL (ref 68–131)
ERYTHROCYTE [DISTWIDTH] IN BLOOD BY AUTOMATED COUNT: 13.7 % (ref 11.5–14.5)
GFR SERPLBLD CREATININE-BSD FMLA CKD-EPI: >60 ML/MIN/1.73/M2
GLUCOSE SERPL-MCNC: 79 MG/DL (ref 70–110)
HBA1C MFR BLD: 5.6 % (ref 4–5.6)
HCT VFR BLD AUTO: 43.4 % (ref 37–48.5)
HDLC SERPL-MCNC: 56 MG/DL (ref 40–75)
HDLC SERPL: 21.7 % (ref 20–50)
HGB BLD-MCNC: 13.4 GM/DL (ref 12–16)
IMM GRANULOCYTES # BLD AUTO: 0.02 K/UL (ref 0–0.04)
IMM GRANULOCYTES NFR BLD AUTO: 0.2 % (ref 0–0.5)
LDLC SERPL CALC-MCNC: 178 MG/DL (ref 63–159)
LYMPHOCYTES # BLD AUTO: 2.34 K/UL (ref 1–4.8)
MCH RBC QN AUTO: 29.1 PG (ref 27–31)
MCHC RBC AUTO-ENTMCNC: 30.9 G/DL (ref 32–36)
MCV RBC AUTO: 94 FL (ref 82–98)
NONHDLC SERPL-MCNC: 202 MG/DL
NUCLEATED RBC (/100WBC) (OHS): 0 /100 WBC
PLATELET # BLD AUTO: 318 K/UL (ref 150–450)
PMV BLD AUTO: 10.9 FL (ref 9.2–12.9)
POTASSIUM SERPL-SCNC: 4.4 MMOL/L (ref 3.5–5.1)
PROT SERPL-MCNC: 7 GM/DL (ref 6–8.4)
RBC # BLD AUTO: 4.6 M/UL (ref 4–5.4)
RELATIVE EOSINOPHIL (OHS): 1.5 %
RELATIVE LYMPHOCYTE (OHS): 26.3 % (ref 18–48)
RELATIVE MONOCYTE (OHS): 7.5 % (ref 4–15)
RELATIVE NEUTROPHIL (OHS): 63.7 % (ref 38–73)
SODIUM SERPL-SCNC: 140 MMOL/L (ref 136–145)
T4 FREE SERPL-MCNC: 0.93 NG/DL (ref 0.71–1.51)
TRIGL SERPL-MCNC: 120 MG/DL (ref 30–150)
TSH SERPL-ACNC: 2.19 UIU/ML (ref 0.4–4)
WBC # BLD AUTO: 8.89 K/UL (ref 3.9–12.7)

## 2025-05-02 PROCEDURE — 83704 LIPOPROTEIN BLD QUAN PART: CPT

## 2025-05-02 PROCEDURE — 83036 HEMOGLOBIN GLYCOSYLATED A1C: CPT

## 2025-05-02 PROCEDURE — 82040 ASSAY OF SERUM ALBUMIN: CPT

## 2025-05-02 PROCEDURE — 85025 COMPLETE CBC W/AUTO DIFF WBC: CPT

## 2025-05-02 PROCEDURE — 36415 COLL VENOUS BLD VENIPUNCTURE: CPT | Mod: PO

## 2025-05-02 PROCEDURE — 82172 ASSAY OF APOLIPOPROTEIN: CPT

## 2025-05-02 PROCEDURE — 84478 ASSAY OF TRIGLYCERIDES: CPT

## 2025-05-02 PROCEDURE — 83695 ASSAY OF LIPOPROTEIN(A): CPT

## 2025-05-02 PROCEDURE — 84439 ASSAY OF FREE THYROXINE: CPT

## 2025-05-02 PROCEDURE — 84443 ASSAY THYROID STIM HORMONE: CPT

## 2025-05-04 LAB — APO B SERPL-MCNC: 126 MG/DL

## 2025-05-05 LAB — W LP(A): 231 NMOL/L

## 2025-05-06 ENCOUNTER — HOSPITAL ENCOUNTER (OUTPATIENT)
Dept: RADIOLOGY | Facility: OTHER | Age: 60
Discharge: HOME | End: 2025-05-06
Attending: INTERNAL MEDICINE
Payer: COMMERCIAL

## 2025-05-06 DIAGNOSIS — F17.200 TOBACCO DEPENDENCE: ICD-10-CM

## 2025-05-06 PROCEDURE — 71271 CT THORAX LUNG CANCER SCR C-: CPT | Mod: 26,,, | Performed by: RADIOLOGY

## 2025-05-06 PROCEDURE — 71271 CT THORAX LUNG CANCER SCR C-: CPT | Mod: TC

## 2025-05-07 LAB
HDL SERPL-SCNC: 41 MCMOL/L
LDL SERPL-SCNC: 2166 NMOL/L
LDLC SERPL-MCNC: 183 MG/DL

## 2025-05-08 ENCOUNTER — TELEPHONE (OUTPATIENT)
Dept: PULMONOLOGY | Facility: CLINIC | Age: 60
End: 2025-05-08
Payer: COMMERCIAL

## 2025-05-08 NOTE — TELEPHONE ENCOUNTER
----- Message from Tech Jasimine sent at 5/8/2025  1:36 PM CDT -----  Regarding: Re: sooner appt  Contact: [phone]  Type:  Needs Medical AdviceWho Called: Dr Dionne Mares ([phone]) called to request the pt be seen sooner than 5/22. Pt will be out of town on 5/23. Nurse voiced the pt needs a lung biopsy.Would the patient rather a call back or a response via [facility]? [facility] Call Back Number:  [phone]

## 2025-05-08 NOTE — TELEPHONE ENCOUNTER
I spoke with Suzan with Dr Mckenna's office to let her know patient has the soonest appointment with Dr Shepard and/or Dr Huff. Patient is scheduled with Dr Shepard on 5/22/25 at 9am for consult. If we have any cancellations I will call the patient to offer her a sooner appointment. Suzan verbalized understanding.

## 2025-05-08 NOTE — TELEPHONE ENCOUNTER
I spoke with patient to reschedule sooner appointment with Dr Shepard for 5/12/25 at 10:30am. Patient confirmed and verbalized understanding.

## 2025-05-12 ENCOUNTER — OFFICE VISIT (OUTPATIENT)
Dept: PULMONOLOGY | Facility: CLINIC | Age: 60
End: 2025-05-12
Payer: COMMERCIAL

## 2025-05-12 ENCOUNTER — HOSPITAL ENCOUNTER (OUTPATIENT)
Dept: PULMONOLOGY | Facility: CLINIC | Age: 60
Discharge: HOME | End: 2025-05-12
Payer: COMMERCIAL

## 2025-05-12 VITALS
SYSTOLIC BLOOD PRESSURE: 107 MMHG | DIASTOLIC BLOOD PRESSURE: 52 MMHG | WEIGHT: 139.13 LBS | HEIGHT: 68 IN | BODY MASS INDEX: 21.09 KG/M2 | HEART RATE: 82 BPM

## 2025-05-12 DIAGNOSIS — R91.8 LUNG MASS: ICD-10-CM

## 2025-05-12 DIAGNOSIS — R91.8 LUNG MASS: Primary | ICD-10-CM

## 2025-05-12 DIAGNOSIS — Z72.0 TOBACCO ABUSE: ICD-10-CM

## 2025-05-12 DIAGNOSIS — J43.2 CENTRILOBULAR EMPHYSEMA: ICD-10-CM

## 2025-05-12 PROBLEM — J43.9 EMPHYSEMA LUNG: Status: ACTIVE | Noted: 2025-05-12

## 2025-05-12 LAB
DLCO ADJ PRE: 12.61 ML/(MIN*MMHG) (ref 18.87–30.34)
DLCO SINGLE BREATH LLN: 18.87
DLCO SINGLE BREATH PRE REF: 51.2 %
DLCO SINGLE BREATH REF: 24.61
DLCOC SBVA LLN: 3.18
DLCOC SBVA PRE REF: 64.1 %
DLCOC SBVA REF: 4.52
DLCOC SINGLE BREATH LLN: 18.87
DLCOC SINGLE BREATH PRE REF: 51.2 %
DLCOC SINGLE BREATH REF: 24.61
DLCOCSBVAULN: 5.86
DLCOCSINGLEBREATHULN: 30.34
DLCOCSINGLEBREATHZSCORE: -3.44
DLCOSINGLEBREATHULN: 30.34
DLCOSINGLEBREATHZSCORE: -3.44
DLCOVA LLN: 3.18
DLCOVA PRE REF: 64.1 %
DLCOVA PRE: 2.9 ML/(MIN*MMHG*L) (ref 3.18–5.86)
DLCOVA REF: 4.52
DLCOVAULN: 5.86
DLVAADJ PRE: 2.9 ML/(MIN*MMHG*L) (ref 3.18–5.86)
ERVN2 LLN: -16449.17
ERVN2 PRE REF: 70.4 %
ERVN2 PRE: 0.59 L (ref -16449.17–16450.83)
ERVN2 REF: 0.83
ERVN2ULN: ABNORMAL
FEF 25 75 LLN: 1.61
FEF 25 75 PRE REF: 52.6 %
FEF 25 75 REF: 3.01
FET100 CHG: 4.1 %
FEV05 LLN: 1.13
FEV05 REF: 1.99
FEV1 CHG: -1 %
FEV1 FVC LLN: 67
FEV1 FVC PRE REF: 91.9 %
FEV1 FVC REF: 79
FEV1 LLN: 2.06
FEV1 PRE REF: 82.9 %
FEV1 REF: 2.73
FEV1 VOL CHG: -0.02
FEV1FVCZSCORE: -0.91
FEV1ZSCORE: -1.15
FRCN2 LLN: 2.05
FRCN2 PRE REF: 84.4 %
FRCN2 REF: 2.87
FRCN2ULN: 3.69
FVC CHG: -2.4 %
FVC LLN: 2.64
FVC PRE REF: 89.5 %
FVC REF: 3.49
FVC VOL CHG: -0.07
FVCZSCORE: -0.7
ICN2REF: 2.38
IVC PRE: 2.89 L (ref 2.64–4.38)
IVC SINGLE BREATH LLN: 2.64
IVC SINGLE BREATH PRE REF: 82.7 %
IVC SINGLE BREATH REF: 3.49
IVCSINGLEBREATHULN: 4.38
LLN IC N2: -16447.62
PEF LLN: 4.89
PEF PRE REF: 101.6 %
PEF REF: 6.77
PHYSICIAN COMMENT: ABNORMAL
POST FEF 25 75: 1.57 L/S (ref 1.61–4.41)
POST FET 100: 7.05 SEC
POST FEV1 FVC: 73.46 % (ref 66.84–89.14)
POST FEV1: 2.24 L (ref 2.06–3.38)
POST FEV5: 1.76 L (ref 1.13–2.84)
POST FVC: 3.05 L (ref 2.64–4.38)
POST PEF: 7.16 L/S (ref 4.89–8.64)
PRE DLCO: 12.61 ML/(MIN*MMHG) (ref 18.87–30.34)
PRE FEF 25 75: 1.58 L/S (ref 1.61–4.41)
PRE FET 100: 6.78 SEC
PRE FEV05 REF: 83.8 %
PRE FEV1 FVC: 72.47 % (ref 66.84–89.14)
PRE FEV1: 2.26 L (ref 2.06–3.38)
PRE FEV5: 1.66 L (ref 1.13–2.84)
PRE FRC N2: 2.42 L (ref 2.05–3.69)
PRE FVC: 3.12 L (ref 2.64–4.38)
PRE IC N2: 2.54 L (ref -16447.62–16452.38)
PRE PEF: 6.88 L/S (ref 4.89–8.64)
PRE REF IC N2: 106.7 %
RVN2 LLN: 1.46
RVN2 PRE REF: 90.1 %
RVN2 PRE: 1.84 L (ref 1.46–2.62)
RVN2 REF: 2.04
RVN2TLCN2 LLN: 29.77
RVN2TLCN2 PRE REF: 94.1 %
RVN2TLCN2 PRE: 37.05 % (ref 29.77–48.95)
RVN2TLCN2 REF: 39.36
RVN2TLCN2ULN: 48.95
RVN2ULN: 2.62
TLCN2 LLN: 4.45
TLCN2 PRE REF: 91.2 %
TLCN2 PRE: 4.96 L (ref 4.45–6.43)
TLCN2 REF: 5.44
TLCN2ULN: 6.43
TLCN2ZSCORE: -0.8
ULN IC N2: ABNORMAL
VA PRE: 4.35 L (ref 5.29–5.29)
VA SINGLE BREATH LLN: 5.29
VA SINGLE BREATH PRE REF: 82.2 %
VA SINGLE BREATH REF: 5.29
VASINGLEBREATHULN: 5.29
VCMAXN2 LLN: 2.64
VCMAXN2 PRE REF: 89.5 %
VCMAXN2 PRE: 3.12 L (ref 2.64–4.38)
VCMAXN2 REF: 3.49
VCMAXN2ULN: 4.38

## 2025-05-12 PROCEDURE — 99999 PR PBB SHADOW E&M-EST. PATIENT-LVL V: CPT | Mod: PBBFAC,,, | Performed by: INTERNAL MEDICINE

## 2025-05-12 NOTE — ASSESSMENT & PLAN NOTE
Attempting to quit with Welbutrin and Nicotine patches.   Declined smoking cessation.  Advised on the importance of complete cessation.

## 2025-05-12 NOTE — PROGRESS NOTES
Subjective:       Patient ID: Radha Mario is a 59 y.o. female.    Consult from Dr Nicky Mckenna.    Chief Complaint: Pulmonary nodule    58 yo current smoker, trying to quit with a history of semisolid nodule on lung cancer screening, now more conspicuous.  Here today for evaluation.  Recently had URI with a persistent cough.  No STEPHEN, no wheezing.  Never used an inhaler, no PND    No cardiac history   History of breast cancer. Ms. 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 These records are not available for review, but her mammogram record notes a scar on the left breast. Up to date on mammograms.   Review of Systems   Constitutional:  Negative for weight loss.   Respiratory:  Positive for cough. Negative for chest tightness and use of rescue inhaler.    Cardiovascular:  Negative for chest pain and leg swelling.   Genitourinary:  Negative for difficulty urinating.   Endocrine:  Negative for cold intolerance and heat intolerance.    Musculoskeletal:  Negative for arthralgias.   Gastrointestinal:  Negative for acid reflux.   Neurological:  Negative for headaches.   Hematological:  Negative for adenopathy.   Psychiatric/Behavioral:  Negative for confusion.          Takes care of her two grandchildren without limitations.    Objective:      Physical Exam   Constitutional: She is oriented to person, place, and time. She appears well-developed and well-nourished.   HENT:   Head: Normocephalic.   Cardiovascular: Normal rate and regular rhythm.   Pulmonary/Chest: Normal expansion. She has no wheezes. She has no rales.   Musculoskeletal:         General: No edema. Normal range of motion.      Cervical back: Normal range of motion and neck supple.   Lymphadenopathy: No supraclavicular adenopathy is present.     She has no cervical adenopathy.   Neurological: She is alert and oriented to person, place, and time.   Skin: Skin is warm  "and dry.   Psychiatric: She has a normal mood and affect.   Vitals reviewed.    Personal Diagnostic Review  LDCT 5/6/25 Significantly enlarged ZACHERY semisolid nodule.  Adrenal thickening/nodule. Emphysema is present.         Spirometry is normal, lung volumes normal.  Diffusion decreased.               5/12/2025    10:31 AM 5/6/2025     2:35 PM 6/24/2024     1:38 PM 5/3/2023     9:30 AM 4/19/2023     2:25 PM 4/19/2023     1:55 PM 4/19/2023     1:30 PM   Pulmonary Function Tests   SpO2     100 % 99 % 100 %   Height 5' 8" (1.727 m) 5' 8" (1.727 m) 5' 7" (1.702 m) 5' 7" (1.702 m)      Weight 63.1 kg (139 lb 1.8 oz) 63.2 kg (139 lb 6.4 oz) 64 kg (141 lb 1.5 oz) 64.1 kg (141 lb 6.8 oz)      BMI (Calculated) 21.2 21.2 22.1 22.1            Assessment:       1. Lung mass    2. Tobacco abuse    3. Centrilobular emphysema        Encounter Medications[1]  Orders Placed This Encounter   Procedures    NM PET CT FDG Skull Base to Mid Thigh     Standing Status:   Future     Expected Date:   5/12/2025     Expiration Date:   5/12/2026     May the Radiologist modify the order per protocol to meet the clinical needs of the patient?:   Yes    Ambulatory referral/consult to Thoracic Surgery     Standing Status:   Future     Expected Date:   5/19/2025     Expiration Date:   6/12/2026     Referral Priority:   Routine     Referral Type:   Consultation     Referred to Provider:   Vickey Chaudhari MD     Requested Specialty:   Thoracic Surgery     Number of Visits Requested:   1    Complete PFT w/ bronchodilator     Standing Status:   Future     Number of Occurrences:   1     Expiration Date:   5/12/2026     Release to patient:   Immediate    Case Request Operating Room: ROBOTIC BRONCHOSCOPY     Standing Status:   Standing     Number of Occurrences:   1     Medical Necessity::   Medically Urgent [101]     Case classification:   E - Elective [90]     Post-Procedure Disposition::   Amb Surgery/DOSC [2]     Is an on-site pathologist required " for this procedure?:   N/A     Plan:     Problem List Items Addressed This Visit       Tobacco abuse    Current Assessment & Plan   Attempting to quit with Welbutrin and Nicotine patches.   Declined smoking cessation.  Advised on the importance of complete cessation.          Lung mass - Primary    Relevant Orders    Complete PFT w/ bronchodilator (Completed)    NM PET CT FDG Skull Base to Mid Thigh    Ambulatory referral/consult to Thoracic Surgery    Case Request Operating Room: ROBOTIC BRONCHOSCOPY (Completed)    Emphysema lung    Overview   Seen on CT imaging.  Spirometry without obstruction. Diffusion moderately reduced.  Gold Class A, asymptomatic.    Smoking cessation advised.             Counseled patient and with her smoking history explained that this is concerning for a lung cancer primary.  PFTs adequate for lobar resection.  She is interested in a single anesthesia event procedure where a diagnostic robotic bronchoscopy is performed with pathology on site.  If malignancy confirmed and she is deemed a surgical candidate with thoracic surgery (consult placed for evaluation), then resection performed in one day.  Will need clinical staging with PET (scheduled) and a cardiac evaluation per thoracic surgery.  Smoking cessation is necessary for best surgical outcomes. Tentatively scheduled for 6/3/25 pending surgical evaluation and availability.             [1]   Outpatient Encounter Medications as of 5/12/2025   Medication Sig Dispense Refill    azelastine (ASTELIN) 137 mcg (0.1 %) nasal spray SPRAY 1 SPRAY BY NASAL ROUTE TWICE DAILY 30 mL 2    buPROPion (WELLBUTRIN XL) 150 MG TB24 tablet Take 1 tablet (150 mg total) by mouth once daily. 90 tablet 1    ergocalciferol, vitamin D2, (VITAMIN D ORAL) Take by mouth.      ibandronate (BONIVA) 150 mg tablet TAKE 1 TABLET BY MOUTH EVERY 30 DAYS. 3 tablet 1    ibuprofen (ADVIL,MOTRIN) 600 MG tablet Take 1 tablet (600 mg total) by mouth 3 (three) times daily. 30  tablet 0    magnesium 250 mg Tab Take by mouth once.      raloxifene (EVISTA) 60 mg tablet TAKE 1 TABLET BY MOUTH ONCE DAILY 90 tablet 0    rosuvastatin (CRESTOR) 5 MG tablet Take 1 tablet (5 mg total) by mouth once daily. 90 tablet 3    valACYclovir (VALTREX) 500 MG tablet TAKE 1 TABLET (500 MG TOTAL) BY MOUTH 2 (TWO) TIMES DAILY. FOR 3 DAYS AS NEEDED FOR COLD SORES. 180 tablet 1     Facility-Administered Encounter Medications as of 5/12/2025   Medication Dose Route Frequency Provider Last Rate Last Admin    ceFAZolin 2 g in dextrose 5 % in water (D5W) 5 % 50 mL IVPB (MB+)  2 g Intravenous On Call Procedure Татьяна Motta MD

## 2025-05-12 NOTE — H&P (VIEW-ONLY)
Subjective:       Patient ID: Radha Mario is a 59 y.o. female.    Consult from Dr Nicky Mckenna.    Chief Complaint: Pulmonary nodule    60 yo current smoker, trying to quit with a history of semisolid nodule on lung cancer screening, now more conspicuous.  Here today for evaluation.  Recently had URI with a persistent cough.  No STEPHEN, no wheezing.  Never used an inhaler, no PND    No cardiac history   History of breast cancer. Ms. 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 These records are not available for review, but her mammogram record notes a scar on the left breast. Up to date on mammograms.   Review of Systems   Constitutional:  Negative for weight loss.   Respiratory:  Positive for cough. Negative for chest tightness and use of rescue inhaler.    Cardiovascular:  Negative for chest pain and leg swelling.   Genitourinary:  Negative for difficulty urinating.   Endocrine:  Negative for cold intolerance and heat intolerance.    Musculoskeletal:  Negative for arthralgias.   Gastrointestinal:  Negative for acid reflux.   Neurological:  Negative for headaches.   Hematological:  Negative for adenopathy.   Psychiatric/Behavioral:  Negative for confusion.          Takes care of her two grandchildren without limitations.    Objective:      Physical Exam   Constitutional: She is oriented to person, place, and time. She appears well-developed and well-nourished.   HENT:   Head: Normocephalic.   Cardiovascular: Normal rate and regular rhythm.   Pulmonary/Chest: Normal expansion. She has no wheezes. She has no rales.   Musculoskeletal:         General: No edema. Normal range of motion.      Cervical back: Normal range of motion and neck supple.   Lymphadenopathy: No supraclavicular adenopathy is present.     She has no cervical adenopathy.   Neurological: She is alert and oriented to person, place, and time.   Skin: Skin is warm  "and dry.   Psychiatric: She has a normal mood and affect.   Vitals reviewed.    Personal Diagnostic Review  LDCT 5/6/25 Significantly enlarged ZACHERY semisolid nodule.  Adrenal thickening/nodule. Emphysema is present.         Spirometry is normal, lung volumes normal.  Diffusion decreased.               5/12/2025    10:31 AM 5/6/2025     2:35 PM 6/24/2024     1:38 PM 5/3/2023     9:30 AM 4/19/2023     2:25 PM 4/19/2023     1:55 PM 4/19/2023     1:30 PM   Pulmonary Function Tests   SpO2     100 % 99 % 100 %   Height 5' 8" (1.727 m) 5' 8" (1.727 m) 5' 7" (1.702 m) 5' 7" (1.702 m)      Weight 63.1 kg (139 lb 1.8 oz) 63.2 kg (139 lb 6.4 oz) 64 kg (141 lb 1.5 oz) 64.1 kg (141 lb 6.8 oz)      BMI (Calculated) 21.2 21.2 22.1 22.1            Assessment:       1. Lung mass    2. Tobacco abuse    3. Centrilobular emphysema        Encounter Medications[1]  Orders Placed This Encounter   Procedures    NM PET CT FDG Skull Base to Mid Thigh     Standing Status:   Future     Expected Date:   5/12/2025     Expiration Date:   5/12/2026     May the Radiologist modify the order per protocol to meet the clinical needs of the patient?:   Yes    Ambulatory referral/consult to Thoracic Surgery     Standing Status:   Future     Expected Date:   5/19/2025     Expiration Date:   6/12/2026     Referral Priority:   Routine     Referral Type:   Consultation     Referred to Provider:   Vickey Chaudhari MD     Requested Specialty:   Thoracic Surgery     Number of Visits Requested:   1    Complete PFT w/ bronchodilator     Standing Status:   Future     Number of Occurrences:   1     Expiration Date:   5/12/2026     Release to patient:   Immediate    Case Request Operating Room: ROBOTIC BRONCHOSCOPY     Standing Status:   Standing     Number of Occurrences:   1     Medical Necessity::   Medically Urgent [101]     Case classification:   E - Elective [90]     Post-Procedure Disposition::   Amb Surgery/DOSC [2]     Is an on-site pathologist required " for this procedure?:   N/A     Plan:     Problem List Items Addressed This Visit       Tobacco abuse    Current Assessment & Plan   Attempting to quit with Welbutrin and Nicotine patches.   Declined smoking cessation.  Advised on the importance of complete cessation.          Lung mass - Primary    Relevant Orders    Complete PFT w/ bronchodilator (Completed)    NM PET CT FDG Skull Base to Mid Thigh    Ambulatory referral/consult to Thoracic Surgery    Case Request Operating Room: ROBOTIC BRONCHOSCOPY (Completed)    Emphysema lung    Overview   Seen on CT imaging.  Spirometry without obstruction. Diffusion moderately reduced.  Gold Class A, asymptomatic.    Smoking cessation advised.             Counseled patient and with her smoking history explained that this is concerning for a lung cancer primary.  PFTs adequate for lobar resection.  She is interested in a single anesthesia event procedure where a diagnostic robotic bronchoscopy is performed with pathology on site.  If malignancy confirmed and she is deemed a surgical candidate with thoracic surgery (consult placed for evaluation), then resection performed in one day.  Will need clinical staging with PET (scheduled) and a cardiac evaluation per thoracic surgery.  Smoking cessation is necessary for best surgical outcomes. Tentatively scheduled for 6/3/25 pending surgical evaluation and availability.             [1]   Outpatient Encounter Medications as of 5/12/2025   Medication Sig Dispense Refill    azelastine (ASTELIN) 137 mcg (0.1 %) nasal spray SPRAY 1 SPRAY BY NASAL ROUTE TWICE DAILY 30 mL 2    buPROPion (WELLBUTRIN XL) 150 MG TB24 tablet Take 1 tablet (150 mg total) by mouth once daily. 90 tablet 1    ergocalciferol, vitamin D2, (VITAMIN D ORAL) Take by mouth.      ibandronate (BONIVA) 150 mg tablet TAKE 1 TABLET BY MOUTH EVERY 30 DAYS. 3 tablet 1    ibuprofen (ADVIL,MOTRIN) 600 MG tablet Take 1 tablet (600 mg total) by mouth 3 (three) times daily. 30  tablet 0    magnesium 250 mg Tab Take by mouth once.      raloxifene (EVISTA) 60 mg tablet TAKE 1 TABLET BY MOUTH ONCE DAILY 90 tablet 0    rosuvastatin (CRESTOR) 5 MG tablet Take 1 tablet (5 mg total) by mouth once daily. 90 tablet 3    valACYclovir (VALTREX) 500 MG tablet TAKE 1 TABLET (500 MG TOTAL) BY MOUTH 2 (TWO) TIMES DAILY. FOR 3 DAYS AS NEEDED FOR COLD SORES. 180 tablet 1     Facility-Administered Encounter Medications as of 5/12/2025   Medication Dose Route Frequency Provider Last Rate Last Admin    ceFAZolin 2 g in dextrose 5 % in water (D5W) 5 % 50 mL IVPB (MB+)  2 g Intravenous On Call Procedure Татьяна Motta MD

## 2025-05-19 ENCOUNTER — HOSPITAL ENCOUNTER (OUTPATIENT)
Dept: RADIOLOGY | Facility: HOSPITAL | Age: 60
Discharge: HOME | End: 2025-05-19
Attending: INTERNAL MEDICINE
Payer: COMMERCIAL

## 2025-05-19 ENCOUNTER — PATIENT MESSAGE (OUTPATIENT)
Dept: CARDIOTHORACIC SURGERY | Facility: CLINIC | Age: 60
End: 2025-05-19

## 2025-05-19 ENCOUNTER — PATIENT MESSAGE (OUTPATIENT)
Dept: PULMONOLOGY | Facility: CLINIC | Age: 60
End: 2025-05-19
Payer: COMMERCIAL

## 2025-05-19 ENCOUNTER — OFFICE VISIT (OUTPATIENT)
Dept: CARDIOTHORACIC SURGERY | Facility: CLINIC | Age: 60
End: 2025-05-19
Payer: COMMERCIAL

## 2025-05-19 VITALS
OXYGEN SATURATION: 98 % | WEIGHT: 134 LBS | SYSTOLIC BLOOD PRESSURE: 117 MMHG | BODY MASS INDEX: 20.31 KG/M2 | HEIGHT: 68 IN | DIASTOLIC BLOOD PRESSURE: 67 MMHG

## 2025-05-19 DIAGNOSIS — D68.9 COAGULOPATHY: Primary | ICD-10-CM

## 2025-05-19 DIAGNOSIS — E27.8 ADRENAL MASS: ICD-10-CM

## 2025-05-19 DIAGNOSIS — R91.8 LUNG MASS: ICD-10-CM

## 2025-05-19 LAB — POCT GLUCOSE: 72 MG/DL (ref 70–110)

## 2025-05-19 PROCEDURE — 3008F BODY MASS INDEX DOCD: CPT | Mod: CPTII,S$GLB,, | Performed by: STUDENT IN AN ORGANIZED HEALTH CARE EDUCATION/TRAINING PROGRAM

## 2025-05-19 PROCEDURE — 3044F HG A1C LEVEL LT 7.0%: CPT | Mod: CPTII,S$GLB,, | Performed by: STUDENT IN AN ORGANIZED HEALTH CARE EDUCATION/TRAINING PROGRAM

## 2025-05-19 PROCEDURE — 1159F MED LIST DOCD IN RCRD: CPT | Mod: CPTII,S$GLB,, | Performed by: STUDENT IN AN ORGANIZED HEALTH CARE EDUCATION/TRAINING PROGRAM

## 2025-05-19 PROCEDURE — 78815 PET IMAGE W/CT SKULL-THIGH: CPT | Mod: TC

## 2025-05-19 PROCEDURE — A9552 F18 FDG: HCPCS | Performed by: INTERNAL MEDICINE

## 2025-05-19 PROCEDURE — 99205 OFFICE O/P NEW HI 60 MIN: CPT | Mod: S$GLB,,, | Performed by: STUDENT IN AN ORGANIZED HEALTH CARE EDUCATION/TRAINING PROGRAM

## 2025-05-19 PROCEDURE — 3074F SYST BP LT 130 MM HG: CPT | Mod: CPTII,S$GLB,, | Performed by: STUDENT IN AN ORGANIZED HEALTH CARE EDUCATION/TRAINING PROGRAM

## 2025-05-19 PROCEDURE — 3078F DIAST BP <80 MM HG: CPT | Mod: CPTII,S$GLB,, | Performed by: STUDENT IN AN ORGANIZED HEALTH CARE EDUCATION/TRAINING PROGRAM

## 2025-05-19 PROCEDURE — 99999 PR PBB SHADOW E&M-EST. PATIENT-LVL III: CPT | Mod: PBBFAC,,, | Performed by: STUDENT IN AN ORGANIZED HEALTH CARE EDUCATION/TRAINING PROGRAM

## 2025-05-19 PROCEDURE — 78815 PET IMAGE W/CT SKULL-THIGH: CPT | Mod: 26,PI,, | Performed by: NUCLEAR MEDICINE

## 2025-05-19 RX ORDER — FLUDEOXYGLUCOSE F18 500 MCI/ML
13.65 INJECTION INTRAVENOUS
Status: COMPLETED | OUTPATIENT
Start: 2025-05-19 | End: 2025-05-19

## 2025-05-19 RX ADMIN — FLUDEOXYGLUCOSE F-18 13.65 MILLICURIE: 500 INJECTION INTRAVENOUS at 01:05

## 2025-05-20 ENCOUNTER — TELEPHONE (OUTPATIENT)
Dept: PULMONOLOGY | Facility: CLINIC | Age: 60
End: 2025-05-20
Payer: COMMERCIAL

## 2025-05-20 ENCOUNTER — RESULTS FOLLOW-UP (OUTPATIENT)
Dept: PULMONOLOGY | Facility: HOSPITAL | Age: 60
End: 2025-05-20

## 2025-05-20 RX ORDER — DEXAMETHASONE 1 MG/1
1 TABLET ORAL ONCE AS NEEDED
Qty: 1 TABLET | Refills: 0 | Status: SHIPPED | OUTPATIENT
Start: 2025-05-20 | End: 2025-05-20

## 2025-05-20 NOTE — TELEPHONE ENCOUNTER
PET reviewed.  Patient with URI symptoms.  Will not be a candidate for ASHA.  Stepwise approach to diagnosis and management of pulmonary nodule.  Advanced bronchoscopy scheduled 6/3

## 2025-05-21 ENCOUNTER — PATIENT MESSAGE (OUTPATIENT)
Dept: PULMONOLOGY | Facility: CLINIC | Age: 60
End: 2025-05-21
Payer: COMMERCIAL

## 2025-05-22 ENCOUNTER — TELEPHONE (OUTPATIENT)
Dept: CARDIOTHORACIC SURGERY | Facility: CLINIC | Age: 60
End: 2025-05-22
Payer: COMMERCIAL

## 2025-05-22 ENCOUNTER — PATIENT MESSAGE (OUTPATIENT)
Dept: CARDIOTHORACIC SURGERY | Facility: CLINIC | Age: 60
End: 2025-05-22
Payer: COMMERCIAL

## 2025-05-22 NOTE — TELEPHONE ENCOUNTER
Confirmed with patient availability to schedule time sensitive lab draw. Also reviewed pre test instructions.

## 2025-05-30 ENCOUNTER — TELEPHONE (OUTPATIENT)
Dept: PULMONOLOGY | Facility: CLINIC | Age: 60
End: 2025-05-30
Payer: COMMERCIAL

## 2025-05-30 NOTE — TELEPHONE ENCOUNTER
I spoke with patient to let her know arrival time to [facility] for 9am for robotic bronchoscopy with Dr Shepard on 6/3/25. NPO after midnight. I answered all questions. Patient confirmed and verbalized understanding.

## 2025-06-03 ENCOUNTER — ANESTHESIA (OUTPATIENT)
Dept: SURGERY | Facility: HOSPITAL | Age: 60
End: 2025-06-03
Payer: COMMERCIAL

## 2025-06-03 ENCOUNTER — HOSPITAL ENCOUNTER (OUTPATIENT)
Facility: HOSPITAL | Age: 60
Discharge: HOME | End: 2025-06-03
Attending: INTERNAL MEDICINE | Admitting: INTERNAL MEDICINE
Payer: COMMERCIAL

## 2025-06-03 ENCOUNTER — ANESTHESIA EVENT (OUTPATIENT)
Dept: SURGERY | Facility: HOSPITAL | Age: 60
End: 2025-06-03
Payer: COMMERCIAL

## 2025-06-03 ENCOUNTER — TELEPHONE (OUTPATIENT)
Dept: PULMONOLOGY | Facility: CLINIC | Age: 60
End: 2025-06-03

## 2025-06-03 VITALS
DIASTOLIC BLOOD PRESSURE: 46 MMHG | HEIGHT: 68 IN | OXYGEN SATURATION: 99 % | TEMPERATURE: 98 F | BODY MASS INDEX: 20.32 KG/M2 | HEART RATE: 72 BPM | SYSTOLIC BLOOD PRESSURE: 95 MMHG | RESPIRATION RATE: 18 BRPM | WEIGHT: 134.06 LBS

## 2025-06-03 DIAGNOSIS — R91.8 LUNG MASS: ICD-10-CM

## 2025-06-03 DIAGNOSIS — R59.0 CERVICAL LYMPHADENOPATHY: Primary | ICD-10-CM

## 2025-06-03 PROCEDURE — 71000015 HC POSTOP RECOV 1ST HR: Performed by: INTERNAL MEDICINE

## 2025-06-03 PROCEDURE — 25000003 PHARM REV CODE 250: Performed by: NURSE ANESTHETIST, CERTIFIED REGISTERED

## 2025-06-03 PROCEDURE — 25000003 PHARM REV CODE 250: Performed by: ANESTHESIOLOGY

## 2025-06-03 PROCEDURE — 31624 DX BRONCHOSCOPE/LAVAGE: CPT | Mod: ,,, | Performed by: INTERNAL MEDICINE

## 2025-06-03 PROCEDURE — 63600175 PHARM REV CODE 636 W HCPCS: Performed by: NURSE ANESTHETIST, CERTIFIED REGISTERED

## 2025-06-03 PROCEDURE — 36000708 HC OR TIME LEV III 1ST 15 MIN: Performed by: INTERNAL MEDICINE

## 2025-06-03 PROCEDURE — 37000008 HC ANESTHESIA 1ST 15 MINUTES: Performed by: INTERNAL MEDICINE

## 2025-06-03 PROCEDURE — 71000044 HC DOSC ROUTINE RECOVERY FIRST HOUR: Performed by: INTERNAL MEDICINE

## 2025-06-03 PROCEDURE — 37000009 HC ANESTHESIA EA ADD 15 MINS: Performed by: INTERNAL MEDICINE

## 2025-06-03 PROCEDURE — 31628 BRONCHOSCOPY/LUNG BX EACH: CPT | Mod: ,,, | Performed by: INTERNAL MEDICINE

## 2025-06-03 PROCEDURE — 36000709 HC OR TIME LEV III EA ADD 15 MIN: Performed by: INTERNAL MEDICINE

## 2025-06-03 PROCEDURE — 31627 NAVIGATIONAL BRONCHOSCOPY: CPT | Mod: ,,, | Performed by: INTERNAL MEDICINE

## 2025-06-03 PROCEDURE — 27201423 OPTIME MED/SURG SUP & DEVICES STERILE SUPPLY: Performed by: INTERNAL MEDICINE

## 2025-06-03 PROCEDURE — 88341 IMHCHEM/IMCYTCHM EA ADD ANTB: CPT | Mod: TC | Performed by: INTERNAL MEDICINE

## 2025-06-03 PROCEDURE — 31653 BRONCH EBUS SAMPLNG 3/> NODE: CPT | Mod: ,,, | Performed by: INTERNAL MEDICINE

## 2025-06-03 PROCEDURE — 31629 BRONCHOSCOPY/NEEDLE BX EACH: CPT | Mod: ,,, | Performed by: INTERNAL MEDICINE

## 2025-06-03 PROCEDURE — 31654 BRONCH EBUS IVNTJ PERPH LES: CPT | Mod: ,,, | Performed by: INTERNAL MEDICINE

## 2025-06-03 RX ORDER — FENTANYL CITRATE 50 UG/ML
INJECTION, SOLUTION INTRAMUSCULAR; INTRAVENOUS
Status: DISCONTINUED | OUTPATIENT
Start: 2025-06-03 | End: 2025-06-03

## 2025-06-03 RX ORDER — HYDROMORPHONE HYDROCHLORIDE 1 MG/ML
0.2 INJECTION, SOLUTION INTRAMUSCULAR; INTRAVENOUS; SUBCUTANEOUS EVERY 5 MIN PRN
Status: DISCONTINUED | OUTPATIENT
Start: 2025-06-03 | End: 2025-06-03 | Stop reason: HOSPADM

## 2025-06-03 RX ORDER — DEXAMETHASONE SODIUM PHOSPHATE 4 MG/ML
INJECTION, SOLUTION INTRA-ARTICULAR; INTRALESIONAL; INTRAMUSCULAR; INTRAVENOUS; SOFT TISSUE
Status: DISCONTINUED | OUTPATIENT
Start: 2025-06-03 | End: 2025-06-03

## 2025-06-03 RX ORDER — OXYCODONE HYDROCHLORIDE 5 MG/1
5 TABLET ORAL EVERY 4 HOURS PRN
Status: DISCONTINUED | OUTPATIENT
Start: 2025-06-03 | End: 2025-06-03 | Stop reason: HOSPADM

## 2025-06-03 RX ORDER — PROPOFOL 10 MG/ML
VIAL (ML) INTRAVENOUS
Status: DISCONTINUED | OUTPATIENT
Start: 2025-06-03 | End: 2025-06-03

## 2025-06-03 RX ORDER — SODIUM CHLORIDE 0.9 % (FLUSH) 0.9 %
3 SYRINGE (ML) INJECTION
Status: DISCONTINUED | OUTPATIENT
Start: 2025-06-03 | End: 2025-06-03 | Stop reason: HOSPADM

## 2025-06-03 RX ORDER — ROCURONIUM BROMIDE 10 MG/ML
INJECTION, SOLUTION INTRAVENOUS
Status: DISCONTINUED | OUTPATIENT
Start: 2025-06-03 | End: 2025-06-03

## 2025-06-03 RX ORDER — ONDANSETRON HYDROCHLORIDE 2 MG/ML
4 INJECTION, SOLUTION INTRAVENOUS ONCE AS NEEDED
Status: DISCONTINUED | OUTPATIENT
Start: 2025-06-03 | End: 2025-06-03 | Stop reason: HOSPADM

## 2025-06-03 RX ORDER — LIDOCAINE HYDROCHLORIDE 20 MG/ML
INJECTION, SOLUTION EPIDURAL; INFILTRATION; INTRACAUDAL; PERINEURAL
Status: DISCONTINUED | OUTPATIENT
Start: 2025-06-03 | End: 2025-06-03

## 2025-06-03 RX ORDER — METHOCARBAMOL 500 MG/1
1000 TABLET, FILM COATED ORAL ONCE
Status: COMPLETED | OUTPATIENT
Start: 2025-06-03 | End: 2025-06-03

## 2025-06-03 RX ORDER — ACETAMINOPHEN 500 MG
1000 TABLET ORAL ONCE
Status: COMPLETED | OUTPATIENT
Start: 2025-06-03 | End: 2025-06-03

## 2025-06-03 RX ORDER — MIDAZOLAM HYDROCHLORIDE 1 MG/ML
INJECTION INTRAMUSCULAR; INTRAVENOUS
Status: DISCONTINUED | OUTPATIENT
Start: 2025-06-03 | End: 2025-06-03

## 2025-06-03 RX ORDER — ONDANSETRON HYDROCHLORIDE 2 MG/ML
INJECTION, SOLUTION INTRAVENOUS
Status: DISCONTINUED | OUTPATIENT
Start: 2025-06-03 | End: 2025-06-03

## 2025-06-03 RX ADMIN — ACETAMINOPHEN 1000 MG: 500 TABLET ORAL at 01:06

## 2025-06-03 RX ADMIN — ROCURONIUM BROMIDE 50 MG: 10 INJECTION, SOLUTION INTRAVENOUS at 10:06

## 2025-06-03 RX ADMIN — MIDAZOLAM 2 MG: 1 INJECTION INTRAMUSCULAR; INTRAVENOUS at 10:06

## 2025-06-03 RX ADMIN — ONDANSETRON 4 MG: 2 INJECTION INTRAMUSCULAR; INTRAVENOUS at 11:06

## 2025-06-03 RX ADMIN — SODIUM CHLORIDE, SODIUM GLUCONATE, SODIUM ACETATE, POTASSIUM CHLORIDE, MAGNESIUM CHLORIDE, SODIUM PHOSPHATE, DIBASIC, AND POTASSIUM PHOSPHATE: .53; .5; .37; .037; .03; .012; .00082 INJECTION, SOLUTION INTRAVENOUS at 11:06

## 2025-06-03 RX ADMIN — SUGAMMADEX 200 MG: 100 INJECTION, SOLUTION INTRAVENOUS at 12:06

## 2025-06-03 RX ADMIN — PROPOFOL 200 MG: 10 INJECTION, EMULSION INTRAVENOUS at 10:06

## 2025-06-03 RX ADMIN — DEXAMETHASONE SODIUM PHOSPHATE 4 MG: 4 INJECTION INTRA-ARTICULAR; INTRALESIONAL; INTRAMUSCULAR; INTRAVENOUS; SOFT TISSUE at 11:06

## 2025-06-03 RX ADMIN — ROCURONIUM BROMIDE 50 MG: 10 INJECTION, SOLUTION INTRAVENOUS at 11:06

## 2025-06-03 RX ADMIN — PROPOFOL 150 MCG/KG/MIN: 10 INJECTION, EMULSION INTRAVENOUS at 11:06

## 2025-06-03 RX ADMIN — LIDOCAINE HYDROCHLORIDE 100 MG: 20 INJECTION, SOLUTION EPIDURAL; INFILTRATION; INTRACAUDAL at 10:06

## 2025-06-03 RX ADMIN — SODIUM CHLORIDE: 0.9 INJECTION, SOLUTION INTRAVENOUS at 10:06

## 2025-06-03 RX ADMIN — METHOCARBAMOL 1000 MG: 500 TABLET ORAL at 01:06

## 2025-06-03 RX ADMIN — ROCURONIUM BROMIDE 30 MG: 10 INJECTION, SOLUTION INTRAVENOUS at 11:06

## 2025-06-03 RX ADMIN — FENTANYL CITRATE 100 MCG: 50 INJECTION INTRAMUSCULAR; INTRAVENOUS at 10:06

## 2025-06-03 NOTE — DISCHARGE SUMMARY
[facility] - Surgery (2nd Fl)  Discharge Note  Short Stay    Procedure(s) (LRB):  ROBOTIC BRONCHOSCOPY (N/A)      OUTCOME: Patient tolerated treatment/procedure well without complication and is now ready for discharge.    DISPOSITION: Home or Self Care    FINAL DIAGNOSIS:  lung nodule    FOLLOWUP: Will call with results    DISCHARGE INSTRUCTIONS:  No discharge procedures on file.      Clinical Reference Documents Added to Patient Instructions         Document    BRONCHOSCOPY, DIAGNOSTIC (ENGLISH)            TIME SPENT ON DISCHARGE: 15 minutes

## 2025-06-03 NOTE — PROGRESS NOTES
Xray at beside    1359 Discharge instructions given and explained to patient and family with verbalization of understanding all instructions. Patients v/s stable, denies n/v and tolerating po, rates pain level tolerable, IV removed, and family at bedside for patient discharge home.

## 2025-06-05 ENCOUNTER — OFFICE VISIT (OUTPATIENT)
Dept: OTOLARYNGOLOGY | Facility: CLINIC | Age: 60
End: 2025-06-05
Payer: COMMERCIAL

## 2025-06-05 VITALS — SYSTOLIC BLOOD PRESSURE: 109 MMHG | BODY MASS INDEX: 21.29 KG/M2 | DIASTOLIC BLOOD PRESSURE: 65 MMHG | WEIGHT: 140 LBS

## 2025-06-05 DIAGNOSIS — R59.0 CERVICAL LYMPHADENOPATHY: ICD-10-CM

## 2025-06-05 PROCEDURE — 99999 PR PBB SHADOW E&M-EST. PATIENT-LVL III: CPT | Mod: PBBFAC,,, | Performed by: OTOLARYNGOLOGY

## 2025-06-06 ENCOUNTER — HOSPITAL ENCOUNTER (OUTPATIENT)
Dept: RADIOLOGY | Facility: HOSPITAL | Age: 60
Discharge: HOME | End: 2025-06-06
Attending: OTOLARYNGOLOGY
Payer: COMMERCIAL

## 2025-06-06 ENCOUNTER — PATIENT MESSAGE (OUTPATIENT)
Dept: PULMONOLOGY | Facility: CLINIC | Age: 60
End: 2025-06-06
Payer: COMMERCIAL

## 2025-06-06 DIAGNOSIS — R59.0 CERVICAL LYMPHADENOPATHY: ICD-10-CM

## 2025-06-06 LAB
ESTROGEN SERPL-MCNC: ABNORMAL PG/ML
INSULIN SERPL-ACNC: ABNORMAL U[IU]/ML
LAB AP CLINICAL INFORMATION: ABNORMAL
LAB AP COMMENTS: ABNORMAL
LAB AP GROSS DESCRIPTION: ABNORMAL
LAB AP NON-GYN INTERPRETATION SPECIMEN 1: ABNORMAL
LAB AP NON-GYN INTERPRETATION SPECIMEN 2: ABNORMAL
LAB AP NON-GYN INTERPRETATION SPECIMEN 3: ABNORMAL
LAB AP NON-GYN INTERPRETATION SPECIMEN 4: ABNORMAL
LAB AP NON-GYN INTERPRETATION SPECIMEN 5: ABNORMAL
LAB AP PERFORMING LOCATION(S): ABNORMAL
LAB AP REPORT FOOTNOTES: ABNORMAL
Lab: ABNORMAL

## 2025-06-06 PROCEDURE — 76536 US EXAM OF HEAD AND NECK: CPT | Mod: 26,,, | Performed by: RADIOLOGY

## 2025-06-06 PROCEDURE — 76536 US EXAM OF HEAD AND NECK: CPT | Mod: TC

## 2025-06-07 ENCOUNTER — PATIENT MESSAGE (OUTPATIENT)
Dept: CARDIOTHORACIC SURGERY | Facility: CLINIC | Age: 60
End: 2025-06-07
Payer: COMMERCIAL

## 2025-06-07 ENCOUNTER — LAB VISIT (OUTPATIENT)
Dept: LAB | Facility: HOSPITAL | Age: 60
End: 2025-06-07
Attending: PHYSICIAN ASSISTANT
Payer: COMMERCIAL

## 2025-06-07 DIAGNOSIS — E27.8 ADRENAL MASS: ICD-10-CM

## 2025-06-07 LAB — CORTIS SERPL-MCNC: 1.8 UG/DL (ref 4.3–22.4)

## 2025-06-07 PROCEDURE — 36415 COLL VENOUS BLD VENIPUNCTURE: CPT | Mod: PO

## 2025-06-07 PROCEDURE — 82542 COL CHROMOTOGRAPHY QUAL/QUAN: CPT

## 2025-06-07 PROCEDURE — 82533 TOTAL CORTISOL: CPT

## 2025-06-07 PROCEDURE — 83835 ASSAY OF METANEPHRINES: CPT

## 2025-06-07 NOTE — TELEPHONE ENCOUNTER
"Called and reviewed biopsy results with patient.  Pulmonary nodule with atypical cells reported. All lymph nodes are negative for malignancy.  Seen by ENT who feels that the cervical lymph node is reactive.      [place] [facility] [place]    Patient would like to know what this nodule is and 'would like it out",  Will [place] back with thoracic surgery.      "

## 2025-06-09 ENCOUNTER — RESULTS FOLLOW-UP (OUTPATIENT)
Dept: OTOLARYNGOLOGY | Facility: CLINIC | Age: 60
End: 2025-06-09

## 2025-06-11 ENCOUNTER — TELEPHONE (OUTPATIENT)
Dept: CARDIOTHORACIC SURGERY | Facility: CLINIC | Age: 60
End: 2025-06-11
Payer: COMMERCIAL

## 2025-06-11 ENCOUNTER — TUMOR BOARD CONFERENCE (OUTPATIENT)
Dept: CARDIOTHORACIC SURGERY | Facility: CLINIC | Age: 60
End: 2025-06-11
Payer: COMMERCIAL

## 2025-06-11 NOTE — PATIENT CARE CONFERENCE
[facility]      THORACIC MULTIDISCIPLINARY TUMOR BOARD  PATIENT REVIEW FORM  ________________________________________________________________________    CLINIC #: [id number]  DATE: 06/11/2025    TUMOR SITE:   Pulmonary nodule     PRESENTER:   Dr. Drea Chaudhari     PATIENT SUMMARY:   Patient is a 59 y.o. female former smoker with emphysema, h/o left breast cancer (2001, s/p lumpectomy and RT) and high grade squamous intraepithelial lesion presents to clinic for evaluation of ZACHERY pulmonary nodule. LDCT 9/21/21 with GGO in ZACHERY measuring 1.6 cm. Interval scans with progression. LDCT 5/6/25 with nodule measuring 2 cm. PET with nodule measuring 1.8 cm, SUV max 2 as well as hypermetabolic hilar and mediastinal adenopathy. Robotic bronch with EBUS returned atypical cells. LN level 7, 11L, and 11R negative for malignancy.     BOARD RECOMMENDATIONS:   Atypical cells that are suspicious but not diagnostic for malignancy. Agree with plan for segmentectomy with MLND. If LNs are positive will need adjuvant therapy.     CONSULT NEEDED:     [x] Surgery    [] Hem/Onc    [] Rad/Onc    [] Dietary                 [] Social Service    [] Psychology       [] Pulmonology    Clinical Stage: Tumor  Node(s)  Metastasis   Pathologic Stage: Tumor  Node(s)  Metastasis     GROUP STAGE:     [] O    [] 1A    [] IB    [] IIA    [] IIB     [] IIIA     [] IIIB     [] IIIC    [] IV                               [] Local recurrence     [] Regional recurrence     [] Distant recurrence                   [] NSCLC     [] SCLC     Tumor type     Unstageable:      [x] Yes     [] No  Metastatic site(s):          [] Julia'[facility] Treatment Guidelines reviewed and care planned is consistent with guidelines.         (i.e., NCCN, NCI, PD, ACO, AUA, etc.)    PRESENTATION AT CANCER CONFERENCE:         [] Prospective    [] Retrospective     [] Follow-Up          [] Eligible for clinical trial

## 2025-06-16 ENCOUNTER — HOSPITAL ENCOUNTER (OUTPATIENT)
Dept: CARDIOLOGY | Facility: HOSPITAL | Age: 60
Discharge: HOME | End: 2025-06-16
Attending: STUDENT IN AN ORGANIZED HEALTH CARE EDUCATION/TRAINING PROGRAM
Payer: COMMERCIAL

## 2025-06-16 VITALS
BODY MASS INDEX: 20.92 KG/M2 | WEIGHT: 138 LBS | HEART RATE: 85 BPM | HEIGHT: 68 IN | SYSTOLIC BLOOD PRESSURE: 108 MMHG | DIASTOLIC BLOOD PRESSURE: 64 MMHG

## 2025-06-16 DIAGNOSIS — R91.8 LUNG MASS: ICD-10-CM

## 2025-06-16 LAB
CV STRESS BASE HR: 85 BPM
DIASTOLIC BLOOD PRESSURE: 64 MMHG
OHS CV CPX 1 MINUTE RECOVERY HEART RATE: 169 BPM
OHS CV CPX 85 PERCENT MAX PREDICTED HEART RATE MALE: 136
OHS CV CPX ANAEROBIC THRESHOLD DIASTOLIC BLOOD PRESSURE: 64 MMHG
OHS CV CPX ANAEROBIC THRESHOLD HEART RATE: 137
OHS CV CPX ANAEROBIC THRESHOLD RATE PRESSURE PRODUCT: NORMAL
OHS CV CPX ANAEROBIC THRESHOLD SYSTOLIC BLOOD PRESSURE: 148
OHS CV CPX DATA GRADE - AT: 10.4
OHS CV CPX DATA GRADE - PEAK: 18.9
OHS CV CPX DATA O2 SAT - PEAK: 97
OHS CV CPX DATA O2 SAT - REST: 97
OHS CV CPX DATA SPEED - AT: 3
OHS CV CPX DATA SPEED - PEAK: 5.1
OHS CV CPX DATA TIME - AT: 5.13
OHS CV CPX DATA TIME - PEAK: 9
OHS CV CPX DATA VE/VCO2 - AT: 39
OHS CV CPX DATA VE/VCO2 - PEAK: 44
OHS CV CPX DATA VE/VO2 - AT: 31
OHS CV CPX DATA VE/VO2 - PEAK: 49
OHS CV CPX DATA VO2 - AT: 17
OHS CV CPX DATA VO2 - PEAK: 25.9
OHS CV CPX DATA VO2 - REST: 5.3
OHS CV CPX FEV1/FVC: 0.73
OHS CV CPX FORCED EXPIRATORY VOLUME: 2.11
OHS CV CPX FORCED VITAL CAPACITY (FVC): 2.89
OHS CV CPX HIGHEST VO: 27.5
OHS CV CPX MAX PREDICTED HEART RATE: 160
OHS CV CPX MAXIMAL VOLUNTARY VENTILATION (MVV) PREDICTED: 84.4
OHS CV CPX MAXIMAL VOLUNTARY VENTILATION (MVV): 84
OHS CV CPX MAXIUMUM EXERCISE VENTILATION (VE MAX): 77.9
OHS CV CPX PATIENT AGE: 60
OHS CV CPX PATIENT HEIGHT IN: 68
OHS CV CPX PATIENT IS FEMALE AGE 11-19: 0
OHS CV CPX PATIENT IS FEMALE AGE GREATER THAN 19: 1
OHS CV CPX PATIENT IS FEMALE AGE LESS THAN 11: 0
OHS CV CPX PATIENT IS FEMALE: 1
OHS CV CPX PATIENT IS MALE AGE 11-25: 0
OHS CV CPX PATIENT IS MALE AGE GREATER THAN 25: 0
OHS CV CPX PATIENT IS MALE AGE LESS THAN 11: 0
OHS CV CPX PATIENT IS MALE GREATER THAN 18: 0
OHS CV CPX PATIENT IS MALE LESS THAN OR EQUAL TO 18: 0
OHS CV CPX PATIENT IS MALE: 0
OHS CV CPX PATIENT WEIGHT RETURNED IN OZ: 2208
OHS CV CPX PEAK DIASTOLIC BLOOD PRESSURE: 78 MMHG
OHS CV CPX PEAK HEAR RATE: 181 BPM
OHS CV CPX PEAK RATE PRESSURE PRODUCT: NORMAL
OHS CV CPX PEAK SYSTOLIC BLOOD PRESSURE: 156 MMHG
OHS CV CPX PERCENT BODY FAT: 16.2
OHS CV CPX PERCENT MAX PREDICTED HEART RATE ACHIEVED: 118
OHS CV CPX PREDICTED VO2: 27.5 ML/KG/MIN
OHS CV CPX RATE PRESSURE PRODUCT PRESENTING: 9180
OHS CV CPX REST PET CO2: 24
OHS CV CPX VE/VCO2 SLOPE: 35.3
STRESS ECHO POST EXERCISE DUR MIN: 9 MINUTES
STRESS ECHO POST EXERCISE DUR SEC: 0 SECONDS
SYSTOLIC BLOOD PRESSURE: 108 MMHG

## 2025-06-16 PROCEDURE — 94621 CARDIOPULM EXERCISE TESTING: CPT | Mod: 26,,, | Performed by: INTERNAL MEDICINE

## 2025-06-16 PROCEDURE — 94621 CARDIOPULM EXERCISE TESTING: CPT

## 2025-06-20 NOTE — PROGRESS NOTES
History & Physical    Subjective   Interval history:  PET with ZACHERY nodule measuring 1.8 cm, SUV max 2, and hypermetabolic hilar adenopathy. Robotic bronch returned atypical cells. LN level 7, 11L, and 11R negative. Here today to discuss surgery.    History of Present Illness:  Patient is a 59 y.o. female former smoker with emphysema, h/o left breast cancer (2001, s/p lumpectomy and RT) and high grade squamous intraepithelial lesion presents to clinic for evaluation of ZACHERY pulmonary nodule. LDCT 9/21/21 with GGO in ZACHERY measuring 1.6 cm. Interval scans with progression. LDCT 5/6/25 with with nodule measuring 2 cm. PET scheduled. Scheduled for robotic bronch with Dr. Shepard.     Today, reports feeling well overall. Denies SOB. Reports she quit smoking 6 days ago. Performs ADLs independently. Not on blood thinners, No previous chest surgeries.    PSH: breast lumpectomy  Former smoker, quit 6 days ago 1-1.5PPD x30 years    Chief Complaint   Patient presents with    Pre-op Exam       Review of patient's allergies indicates:  No Known Allergies    Current Medications[1]    Past Medical History:   Diagnosis Date    Abnormal Pap smear of cervix 01/08/2018    Hpv +      Anxiety     Breast cancer 2001    Left Lumpectomy     Menopause 2001     Past Surgical History:   Procedure Laterality Date    AUGMENTATION OF BREAST  2000    BREAST LUMPECTOMY  2001    then radiation    COLD KNIFE CONIZATION OF CERVIX N/A 4/19/2023    Procedure: CONE BIOPSY, CERVIX, USING COLD KNIFE;  Surgeon: Татьяна Motta MD;  Location: [facility] OR;  Service: OB/GYN;  Laterality: N/A;    ROBOTIC BRONCHOSCOPY N/A 6/3/2025    Procedure: ROBOTIC BRONCHOSCOPY;  Surgeon: Fior Shepard MD;  Location: [address];  Service: Pulmonary;  Laterality: N/A;     Family History   Problem Relation Name Age of Onset    Stroke Father      Hypertension Mother      Breast cancer Mother      Colon cancer Neg Hx      Diabetes Neg Hx      Ovarian cancer Neg Hx    "    Social History[2]     Review of Systems:  Review of Systems   Constitutional:  Negative for chills and fever.   HENT:  Positive for sneezing.    Respiratory:  Positive for cough. Negative for shortness of breath.    Cardiovascular:  Negative for chest pain.   Gastrointestinal:  Positive for diarrhea. Negative for constipation, nausea and vomiting.   Neurological:  Negative for dizziness and weakness.   Hematological:  Negative for adenopathy.   Psychiatric/Behavioral:  Negative for agitation. The patient is not nervous/anxious.           Objective     Vital Signs (Most Recent)  Pulse: 93 (06/23/25 1320)  BP: (!) 115/53 (06/23/25 1320)  SpO2: 95 % (06/23/25 1320)  5' 8" (1.727 m)  62.5 kg (137 lb 12.6 oz)     Physical Exam:  Physical Exam  Constitutional:       Appearance: Normal appearance.   HENT:      Head: Normocephalic and atraumatic.   Eyes:      Extraocular Movements: Extraocular movements intact.   Cardiovascular:      Rate and Rhythm: Normal rate and regular rhythm.      Pulses: Normal pulses.      Heart sounds: Normal heart sounds.   Pulmonary:      Effort: Pulmonary effort is normal. No respiratory distress.      Breath sounds: Normal breath sounds.   Musculoskeletal:      Cervical back: Normal range of motion.      Right lower leg: No edema.      Left lower leg: No edema.   Skin:     General: Skin is warm and dry.   Neurological:      General: No focal deficit present.      Mental Status: She is alert.   Psychiatric:         Mood and Affect: Mood normal.         Diagnostic Results:  LDCT 9/21/21:  Lungs: 1.6 cm left upper lobe ground-glass opacity is again identified appears unchanged from prior exam. No new masses or nodules. Mild centrilobular and paraseptal emphysematous change. Mild atelectasis. No effusion or pneumothorax. Airways are patent without endoluminal lesion. The lungs show no findings consistent with emphysema.     LDCT 5/6/25:  Lungs: There are abnormal opacities that require further " evaluation. The largest opacity in the left lung appears part solid and measures 2 cm on series 4, image 199 (previously 1.6 cm). Solid component of this opacity is increased in size.. The lungs show findings consistent with emphysema.     PFTs 5/1/25:   FEV1 - 2.26    82.9%  DLCO - 12.61   51.2%      Assessment and Plan   Patient is a 59 y.o. female former smoker with emphysema, h/o left breast cancer (2001, s/p lumpectomy and RT) and high grade squamous intraepithelial lesion presents to clinic for evaluation of ZACHERY pulmonary nodule.    PLAN:  Pre-op labs today  Plan to OR for left robotic assisted upper segmentectomy with MLND, possible thoracotomy - it seems to be amenable to either a posterior segmentectomy or apicoposterior segmentectomy.  Will need consents morning of surgery           [1]   Current Outpatient Medications   Medication Sig Dispense Refill    azelastine (ASTELIN) 137 mcg (0.1 %) nasal spray SPRAY 1 SPRAY BY NASAL ROUTE TWICE DAILY 30 mL 2    dicyclomine (BENTYL) 20 mg tablet 1 bid prn abdominal spasms 20 tablet 1    ergocalciferol, vitamin D2, (VITAMIN D ORAL) Take by mouth.      ibandronate (BONIVA) 150 mg tablet TAKE 1 TABLET BY MOUTH EVERY 30 DAYS. 3 tablet 1    ibuprofen (ADVIL,MOTRIN) 600 MG tablet Take 1 tablet (600 mg total) by mouth 3 (three) times daily. 30 tablet 0    magnesium 250 mg Tab Take by mouth once.      raloxifene (EVISTA) 60 mg tablet TAKE 1 TABLET BY MOUTH ONCE DAILY 90 tablet 0    rosuvastatin (CRESTOR) 5 MG tablet Take 1 tablet (5 mg total) by mouth once daily. 90 tablet 3    buPROPion (WELLBUTRIN XL) 150 MG TB24 tablet Take 1 tablet (150 mg total) by mouth once daily. 90 tablet 1    valACYclovir (VALTREX) 500 MG tablet TAKE 1 TABLET (500 MG TOTAL) BY MOUTH 2 (TWO) TIMES DAILY. FOR 3 DAYS AS NEEDED FOR COLD SORES. 180 tablet 1     No current facility-administered medications for this visit.     Facility-Administered Medications Ordered in Other Visits   Medication Dose  Route Frequency Provider Last Rate Last Admin    ceFAZolin 2 g in dextrose 5 % in water (D5W) 5 % 50 mL IVPB (MB+)  2 g Intravenous On Call Procedure Татьяна Motta MD       [2]   Social History  Tobacco Use    Smoking status: Former     Current packs/day: 0.00     Types: Cigarettes     Quit date: 2023     Years since quittin.3    Smokeless tobacco: Never   Substance Use Topics    Alcohol use: Yes     Comment: socailly     Drug use: No

## 2025-06-20 NOTE — H&P (VIEW-ONLY)
History & Physical    Subjective   Interval history:  PET with ZACHERY nodule measuring 1.8 cm, SUV max 2, and hypermetabolic hilar adenopathy. Robotic bronch returned atypical cells. LN level 7, 11L, and 11R negative. Here today to discuss surgery.    History of Present Illness:  Patient is a 59 y.o. female former smoker with emphysema, h/o left breast cancer (2001, s/p lumpectomy and RT) and high grade squamous intraepithelial lesion presents to clinic for evaluation of ZACHERY pulmonary nodule. LDCT 9/21/21 with GGO in ZACHERY measuring 1.6 cm. Interval scans with progression. LDCT 5/6/25 with with nodule measuring 2 cm. PET scheduled. Scheduled for robotic bronch with Dr. Shepard.     Today, reports feeling well overall. Denies SOB. Reports she quit smoking 6 days ago. Performs ADLs independently. Not on blood thinners, No previous chest surgeries.    PSH: breast lumpectomy  Former smoker, quit 6 days ago 1-1.5PPD x30 years    Chief Complaint   Patient presents with    Pre-op Exam       Review of patient's allergies indicates:  No Known Allergies    Current Medications[1]    Past Medical History:   Diagnosis Date    Abnormal Pap smear of cervix 01/08/2018    Hpv +      Anxiety     Breast cancer 2001    Left Lumpectomy     Menopause 2001     Past Surgical History:   Procedure Laterality Date    AUGMENTATION OF BREAST  2000    BREAST LUMPECTOMY  2001    then radiation    COLD KNIFE CONIZATION OF CERVIX N/A 4/19/2023    Procedure: CONE BIOPSY, CERVIX, USING COLD KNIFE;  Surgeon: Татьяна Motta MD;  Location: [facility] OR;  Service: OB/GYN;  Laterality: N/A;    ROBOTIC BRONCHOSCOPY N/A 6/3/2025    Procedure: ROBOTIC BRONCHOSCOPY;  Surgeon: Fior Shepard MD;  Location: [address];  Service: Pulmonary;  Laterality: N/A;     Family History   Problem Relation Name Age of Onset    Stroke Father      Hypertension Mother      Breast cancer Mother      Colon cancer Neg Hx      Diabetes Neg Hx      Ovarian cancer Neg Hx    "    Social History[2]     Review of Systems:  Review of Systems   Constitutional:  Negative for chills and fever.   HENT:  Positive for sneezing.    Respiratory:  Positive for cough. Negative for shortness of breath.    Cardiovascular:  Negative for chest pain.   Gastrointestinal:  Positive for diarrhea. Negative for constipation, nausea and vomiting.   Neurological:  Negative for dizziness and weakness.   Hematological:  Negative for adenopathy.   Psychiatric/Behavioral:  Negative for agitation. The patient is not nervous/anxious.           Objective     Vital Signs (Most Recent)  Pulse: 93 (06/23/25 1320)  BP: (!) 115/53 (06/23/25 1320)  SpO2: 95 % (06/23/25 1320)  5' 8" (1.727 m)  62.5 kg (137 lb 12.6 oz)     Physical Exam:  Physical Exam  Constitutional:       Appearance: Normal appearance.   HENT:      Head: Normocephalic and atraumatic.   Eyes:      Extraocular Movements: Extraocular movements intact.   Cardiovascular:      Rate and Rhythm: Normal rate and regular rhythm.      Pulses: Normal pulses.      Heart sounds: Normal heart sounds.   Pulmonary:      Effort: Pulmonary effort is normal. No respiratory distress.      Breath sounds: Normal breath sounds.   Musculoskeletal:      Cervical back: Normal range of motion.      Right lower leg: No edema.      Left lower leg: No edema.   Skin:     General: Skin is warm and dry.   Neurological:      General: No focal deficit present.      Mental Status: She is alert.   Psychiatric:         Mood and Affect: Mood normal.         Diagnostic Results:  LDCT 9/21/21:  Lungs: 1.6 cm left upper lobe ground-glass opacity is again identified appears unchanged from prior exam. No new masses or nodules. Mild centrilobular and paraseptal emphysematous change. Mild atelectasis. No effusion or pneumothorax. Airways are patent without endoluminal lesion. The lungs show no findings consistent with emphysema.     LDCT 5/6/25:  Lungs: There are abnormal opacities that require further " evaluation. The largest opacity in the left lung appears part solid and measures 2 cm on series 4, image 199 (previously 1.6 cm). Solid component of this opacity is increased in size.. The lungs show findings consistent with emphysema.     PFTs 5/1/25:   FEV1 - 2.26    82.9%  DLCO - 12.61   51.2%      Assessment and Plan   Patient is a 59 y.o. female former smoker with emphysema, h/o left breast cancer (2001, s/p lumpectomy and RT) and high grade squamous intraepithelial lesion presents to clinic for evaluation of ZACHERY pulmonary nodule.    PLAN:  Pre-op labs today  Plan to OR for left robotic assisted upper segmentectomy with MLND, possible thoracotomy - it seems to be amenable to either a posterior segmentectomy or apicoposterior segmentectomy.  Will need consents morning of surgery           [1]   Current Outpatient Medications   Medication Sig Dispense Refill    azelastine (ASTELIN) 137 mcg (0.1 %) nasal spray SPRAY 1 SPRAY BY NASAL ROUTE TWICE DAILY 30 mL 2    dicyclomine (BENTYL) 20 mg tablet 1 bid prn abdominal spasms 20 tablet 1    ergocalciferol, vitamin D2, (VITAMIN D ORAL) Take by mouth.      ibandronate (BONIVA) 150 mg tablet TAKE 1 TABLET BY MOUTH EVERY 30 DAYS. 3 tablet 1    ibuprofen (ADVIL,MOTRIN) 600 MG tablet Take 1 tablet (600 mg total) by mouth 3 (three) times daily. 30 tablet 0    magnesium 250 mg Tab Take by mouth once.      raloxifene (EVISTA) 60 mg tablet TAKE 1 TABLET BY MOUTH ONCE DAILY 90 tablet 0    rosuvastatin (CRESTOR) 5 MG tablet Take 1 tablet (5 mg total) by mouth once daily. 90 tablet 3    buPROPion (WELLBUTRIN XL) 150 MG TB24 tablet Take 1 tablet (150 mg total) by mouth once daily. 90 tablet 1    valACYclovir (VALTREX) 500 MG tablet TAKE 1 TABLET (500 MG TOTAL) BY MOUTH 2 (TWO) TIMES DAILY. FOR 3 DAYS AS NEEDED FOR COLD SORES. 180 tablet 1     No current facility-administered medications for this visit.     Facility-Administered Medications Ordered in Other Visits   Medication Dose  Route Frequency Provider Last Rate Last Admin    ceFAZolin 2 g in dextrose 5 % in water (D5W) 5 % 50 mL IVPB (MB+)  2 g Intravenous On Call Procedure Татьяна Motta MD       [2]   Social History  Tobacco Use    Smoking status: Former     Current packs/day: 0.00     Types: Cigarettes     Quit date: 2023     Years since quittin.3    Smokeless tobacco: Never   Substance Use Topics    Alcohol use: Yes     Comment: socailly     Drug use: No

## 2025-06-23 ENCOUNTER — LAB VISIT (OUTPATIENT)
Dept: LAB | Facility: HOSPITAL | Age: 60
End: 2025-06-23
Attending: STUDENT IN AN ORGANIZED HEALTH CARE EDUCATION/TRAINING PROGRAM
Payer: COMMERCIAL

## 2025-06-23 ENCOUNTER — OFFICE VISIT (OUTPATIENT)
Dept: CARDIOTHORACIC SURGERY | Facility: CLINIC | Age: 60
End: 2025-06-23
Payer: COMMERCIAL

## 2025-06-23 VITALS
HEIGHT: 68 IN | HEART RATE: 93 BPM | SYSTOLIC BLOOD PRESSURE: 115 MMHG | OXYGEN SATURATION: 95 % | DIASTOLIC BLOOD PRESSURE: 53 MMHG | BODY MASS INDEX: 20.89 KG/M2 | WEIGHT: 137.81 LBS

## 2025-06-23 DIAGNOSIS — R91.8 LUNG MASS: ICD-10-CM

## 2025-06-23 DIAGNOSIS — D68.9 COAGULOPATHY: ICD-10-CM

## 2025-06-23 DIAGNOSIS — R91.8 LUNG MASS: Primary | ICD-10-CM

## 2025-06-23 LAB
ABSOLUTE EOSINOPHIL (OHS): 0.08 K/UL
ABSOLUTE MONOCYTE (OHS): 0.97 K/UL (ref 0.3–1)
ABSOLUTE NEUTROPHIL COUNT (OHS): 6.24 K/UL (ref 1.8–7.7)
ALBUMIN SERPL BCP-MCNC: 3.9 G/DL (ref 3.5–5.2)
ALP SERPL-CCNC: 65 UNIT/L (ref 40–150)
ALT SERPL W/O P-5'-P-CCNC: 19 UNIT/L (ref 10–44)
ANION GAP (OHS): 7 MMOL/L (ref 8–16)
APTT PPP: 26.2 SECONDS (ref 21–32)
AST SERPL-CCNC: 22 UNIT/L (ref 11–45)
BASOPHILS # BLD AUTO: 0.07 K/UL
BASOPHILS NFR BLD AUTO: 0.7 %
BILIRUB SERPL-MCNC: 0.2 MG/DL (ref 0.1–1)
BUN SERPL-MCNC: 8 MG/DL (ref 6–20)
CALCIUM SERPL-MCNC: 9 MG/DL (ref 8.7–10.5)
CHLORIDE SERPL-SCNC: 105 MMOL/L (ref 95–110)
CO2 SERPL-SCNC: 24 MMOL/L (ref 23–29)
CREAT SERPL-MCNC: 0.8 MG/DL (ref 0.5–1.4)
ERYTHROCYTE [DISTWIDTH] IN BLOOD BY AUTOMATED COUNT: 13.5 % (ref 11.5–14.5)
GFR SERPLBLD CREATININE-BSD FMLA CKD-EPI: >60 ML/MIN/1.73/M2
GLUCOSE SERPL-MCNC: 87 MG/DL (ref 70–110)
HCT VFR BLD AUTO: 39.9 % (ref 37–48.5)
HGB BLD-MCNC: 12.6 GM/DL (ref 12–16)
IMM GRANULOCYTES # BLD AUTO: 0.02 K/UL (ref 0–0.04)
IMM GRANULOCYTES NFR BLD AUTO: 0.2 % (ref 0–0.5)
INR PPP: 1 (ref 0.8–1.2)
LYMPHOCYTES # BLD AUTO: 2.23 K/UL (ref 1–4.8)
MCH RBC QN AUTO: 28.8 PG (ref 27–31)
MCHC RBC AUTO-ENTMCNC: 31.6 G/DL (ref 32–36)
MCV RBC AUTO: 91 FL (ref 82–98)
NUCLEATED RBC (/100WBC) (OHS): 0 /100 WBC
PLATELET # BLD AUTO: 287 K/UL (ref 150–450)
PMV BLD AUTO: 10.9 FL (ref 9.2–12.9)
POTASSIUM SERPL-SCNC: 4.2 MMOL/L (ref 3.5–5.1)
PREALB SERPL-MCNC: 20 MG/DL (ref 20–43)
PROT SERPL-MCNC: 7.4 GM/DL (ref 6–8.4)
PROTHROMBIN TIME: 10.7 SECONDS (ref 9–12.5)
RBC # BLD AUTO: 4.37 M/UL (ref 4–5.4)
RELATIVE EOSINOPHIL (OHS): 0.8 %
RELATIVE LYMPHOCYTE (OHS): 23.2 % (ref 18–48)
RELATIVE MONOCYTE (OHS): 10.1 % (ref 4–15)
RELATIVE NEUTROPHIL (OHS): 65 % (ref 38–73)
SODIUM SERPL-SCNC: 136 MMOL/L (ref 136–145)
WBC # BLD AUTO: 9.61 K/UL (ref 3.9–12.7)

## 2025-06-23 PROCEDURE — 85730 THROMBOPLASTIN TIME PARTIAL: CPT

## 2025-06-23 PROCEDURE — 82040 ASSAY OF SERUM ALBUMIN: CPT

## 2025-06-23 PROCEDURE — 84134 ASSAY OF PREALBUMIN: CPT

## 2025-06-23 PROCEDURE — 3078F DIAST BP <80 MM HG: CPT | Mod: CPTII,S$GLB,, | Performed by: STUDENT IN AN ORGANIZED HEALTH CARE EDUCATION/TRAINING PROGRAM

## 2025-06-23 PROCEDURE — 99999 PR PBB SHADOW E&M-EST. PATIENT-LVL IV: CPT | Mod: PBBFAC,,, | Performed by: STUDENT IN AN ORGANIZED HEALTH CARE EDUCATION/TRAINING PROGRAM

## 2025-06-23 PROCEDURE — 85025 COMPLETE CBC W/AUTO DIFF WBC: CPT

## 2025-06-23 PROCEDURE — 99215 OFFICE O/P EST HI 40 MIN: CPT | Mod: S$GLB,,, | Performed by: STUDENT IN AN ORGANIZED HEALTH CARE EDUCATION/TRAINING PROGRAM

## 2025-06-23 PROCEDURE — 36415 COLL VENOUS BLD VENIPUNCTURE: CPT

## 2025-06-23 PROCEDURE — 3044F HG A1C LEVEL LT 7.0%: CPT | Mod: CPTII,S$GLB,, | Performed by: STUDENT IN AN ORGANIZED HEALTH CARE EDUCATION/TRAINING PROGRAM

## 2025-06-23 PROCEDURE — 85610 PROTHROMBIN TIME: CPT

## 2025-06-23 PROCEDURE — 1159F MED LIST DOCD IN RCRD: CPT | Mod: CPTII,S$GLB,, | Performed by: STUDENT IN AN ORGANIZED HEALTH CARE EDUCATION/TRAINING PROGRAM

## 2025-06-23 PROCEDURE — 3008F BODY MASS INDEX DOCD: CPT | Mod: CPTII,S$GLB,, | Performed by: STUDENT IN AN ORGANIZED HEALTH CARE EDUCATION/TRAINING PROGRAM

## 2025-06-23 PROCEDURE — 3074F SYST BP LT 130 MM HG: CPT | Mod: CPTII,S$GLB,, | Performed by: STUDENT IN AN ORGANIZED HEALTH CARE EDUCATION/TRAINING PROGRAM

## 2025-06-24 ENCOUNTER — PATIENT MESSAGE (OUTPATIENT)
Dept: OBSTETRICS AND GYNECOLOGY | Facility: CLINIC | Age: 60
End: 2025-06-24
Payer: COMMERCIAL

## 2025-07-02 RX ORDER — RALOXIFENE HYDROCHLORIDE 60 MG/1
60 TABLET, FILM COATED ORAL
Qty: 90 TABLET | Refills: 0 | Status: SHIPPED | OUTPATIENT
Start: 2025-07-02

## 2025-07-02 NOTE — TELEPHONE ENCOUNTER
Refill Routing Note   Medication(s) are not appropriate for processing by [facility] for the following reason(s):        Outside of protocol    [facility] action(s):  Route               Appointments  past 12m or future 3m with PCP    Date Provider   Last Visit   6/24/2024 Татьяна Motta MD   Next Visit   Visit date not found Татьяна Motta MD   ED visits in past 90 days: 0        Note composed:9:46 AM 07/02/2025

## 2025-07-07 ENCOUNTER — ANESTHESIA EVENT (OUTPATIENT)
Dept: SURGERY | Facility: HOSPITAL | Age: 60
End: 2025-07-07
Payer: COMMERCIAL

## 2025-07-07 ENCOUNTER — TELEPHONE (OUTPATIENT)
Dept: CARDIOTHORACIC SURGERY | Facility: CLINIC | Age: 60
End: 2025-07-07
Payer: COMMERCIAL

## 2025-07-07 NOTE — TELEPHONE ENCOUNTER
Spoke to patient confirmed 8a arrival time. Reviewed pre op instructions. Pt verbalized understanding.

## 2025-07-07 NOTE — ANESTHESIA PREPROCEDURE EVALUATION
[facility]  Anesthesia Pre-Operative Evaluation        Patient Name: Radha Mario  YOB: 1965  MRN: [id number]    SUBJECTIVE:     Pre-operative Evaluation for Procedure(s) (LRB):  XI ROBOTIC RATS, WITH LYMPHADENECTOMY- ZACHERY (Left)  XI ROBOTIC RATS,WITH LOBECTOMY,LUNG- ZACHERY (Left)     07/07/2025    Radha Mario is a 59 y.o. female with a PMHx significant for smoking (quit 3 weeks ago) with emphysema, h/o left breast cancer (2001, s/p lumpectomy and RT), ZACHERY pulmonary nodule.     The patient now presents for the above procedure(s).    Previous Airway  Intubation     Date/Time: 6/3/2025 11:00 AM     Performed by: Brittney Bergeron CRNA  Authorized by: Brittney Bergeron CRNA    Intubation:     Induction:  Intravenous    Intubated:  Postinduction    Mask Ventilation:  Easy mask    Attempts:  1    Attempted By:  CRNA    Method of Intubation:  Video laryngoscopy    Blade:  Rodriguez 3    Laryngeal View Grade: Grade I - full view of cords      Difficult Airway Encountered?: No      Complications:  None    Airway Device:  Oral endotracheal tube    Airway Device Size:  8.5    Style/Cuff Inflation:  Cuffed (inflated to minimal occlusive pressure)    Tube secured:  22    Secured at:  The lips    Placement Verified By:  Capnometry    Complicating Factors:  None    Findings Post-Intubation:  BS equal bilateral and atraumatic/condition of teeth unchanged    Problem List[1]    Past Medical History:   Diagnosis Date    Abnormal Pap smear of cervix 01/08/2018    Hpv +      Anxiety     Breast cancer 2001    Left Lumpectomy     Menopause 2001       Review of patient's allergies indicates:  No Known Allergies    Current Outpatient Medications   Medication Instructions    azelastine (ASTELIN) 137 mcg (0.1 %) nasal spray SPRAY 1 SPRAY BY NASAL ROUTE TWICE DAILY    buPROPion (WELLBUTRIN XL) 150 mg, Oral, Daily    dicyclomine (BENTYL) 20 mg tablet 1 bid prn abdominal spasms    ergocalciferol,  vitamin D2, (VITAMIN D ORAL) Take by mouth.    ibandronate (BONIVA) 150 mg, Oral, Every 30 days    ibuprofen (ADVIL,MOTRIN) 600 mg, Oral, 3 times daily    magnesium 250 mg Tab Once    raloxifene (EVISTA) 60 mg, Oral    rosuvastatin (CRESTOR) 5 mg, Oral, Daily    valACYclovir (VALTREX) 500 mg, Oral, 2 times daily, For 3 days as needed for cold sores.       Past Surgical History:   Procedure Laterality Date    AUGMENTATION OF BREAST  2000    BREAST LUMPECTOMY  2001    then radiation    COLD KNIFE CONIZATION OF CERVIX N/A 4/19/2023    Procedure: CONE BIOPSY, CERVIX, USING COLD KNIFE;  Surgeon: Татьяна Motta MD;  Location: [facility] OR;  Service: OB/GYN;  Laterality: N/A;    ROBOTIC BRONCHOSCOPY N/A 6/3/2025    Procedure: ROBOTIC BRONCHOSCOPY;  Surgeon: Fior Shepard MD;  Location: [facility] OR [address] [place] [address];  Service: Pulmonary;  Laterality: N/A;       Social History     Substance and Sexual Activity   Drug Use No     Alcohol Use: Not on file     Tobacco Use: Medium Risk (6/5/2025)    Patient History     Smoking Tobacco Use: Former     Smokeless Tobacco Use: Never     Passive Exposure: Not on file       OBJECTIVE:     Vital Signs Range (Last 24H):         Significant Labs    Heme Profile  Lab Results   Component Value Date    WBC 9.61 06/23/2025    HGB 12.6 06/23/2025    HCT 39.9 06/23/2025     06/23/2025       Coagulation Studies  Lab Results   Component Value Date    INR 1.0 06/23/2025    APTT 26.2 06/23/2025       BMP  Lab Results   Component Value Date     06/23/2025    K 4.2 06/23/2025     06/23/2025    CO2 24 06/23/2025    BUN 8 06/23/2025    CREATININE 0.8 06/23/2025       Liver Function Tests  Lab Results   Component Value Date    AST 22 06/23/2025    ALT 19 06/23/2025    ALKPHOS 65 06/23/2025    BILITOT 0.2 06/23/2025    PROT 7.4 06/23/2025    ALBUMIN 3.9 06/23/2025       Lipid Profile  Lab Results   Component Value Date    CHOL 258 (H) 05/02/2025    HDL 56 05/02/2025    TRIG 120 05/02/2025        Endocrine Profile  Lab Results   Component Value Date    HGBA1C 5.6 05/02/2025    TSH 2.192 05/02/2025         Cardiac Studies    EKG:   No results found for this or any previous visit.    CHEN  No results found for this or any previous visit.      TTE  No results found for this or any previous visit.      ASSESSMENT/PLAN:           Pre-op Assessment    I have reviewed the Patient Summary Reports.     I have reviewed the Nursing Notes. I have reviewed the NPO Status.   I have reviewed the Medications.     Review of Systems  Anesthesia Hx:             Denies Family Hx of Anesthesia complications.    Denies Personal Hx of Anesthesia complications.                    Cardiovascular:  Exercise tolerance: good        Denies Dysrhythmias.   Denies Angina.                                    Pulmonary:   COPD                     Hepatic/GI:      Denies GERD.                Neurological:    Denies CVA.    Denies Seizures.                                Endocrine:  Denies Diabetes.               Physical Exam  General: Well nourished, Cooperative, Alert and Oriented    Airway:  Mallampati: II / I  Mouth Opening: Normal  TM Distance: Normal  Tongue: Normal  Neck ROM: Normal ROM    Dental:  Intact        Anesthesia Plan  Type of Anesthesia, risks & benefits discussed:    Anesthesia Type: Gen ETT  Intra-op Monitoring Plan: Standard ASA Monitors and Art Line  Post Op Pain Control Plan: multimodal analgesia and IV/PO Opioids PRN  Induction:  IV  Airway Plan: Direct and Video, Post-Induction  Informed Consent: Informed consent signed with the Patient and all parties understand the risks and agree with anesthesia plan.  All questions answered.   ASA Score: 3  Day of Surgery Review of History & Physical: H&P Update referred to the surgeon/provider.    Ready For Surgery From Anesthesia Perspective.     .           [1]   Patient Active Problem List  Diagnosis    Age-related osteoporosis without current pathological fracture     History of breast cancer    Tobacco abuse    High grade squamous intraepithelial lesion (HGSIL), grade 3 MIGEL, on biopsy of cervix    Lung mass    Emphysema lung

## 2025-07-07 NOTE — PRE-PROCEDURE INSTRUCTIONS
PreOp Instructions given:   - Verbal medication information (what to hold and what to take)   - NPO guidelines 2400  --May take AM medications w/sips of water by 0630 (mos)  - Arrival place directions given; time to be given the day before procedure by the   Surgeon's Office DOS[facility] - 0800  - Bathing with antibacterial soap   - Don't wear any jewelry or bring any valuables AM of surgery   - No makeup or moisturizer to face   - No perfume/cologne, powder, lotions or aftershave   Pt. verbalized understanding.   Pt denies any h/o Anesthesia/Sedation complications or side effects.

## 2025-07-08 ENCOUNTER — HOSPITAL ENCOUNTER (INPATIENT)
Facility: HOSPITAL | Age: 60
LOS: 2 days | Discharge: HOME | DRG: 165 | End: 2025-07-10
Attending: STUDENT IN AN ORGANIZED HEALTH CARE EDUCATION/TRAINING PROGRAM | Admitting: STUDENT IN AN ORGANIZED HEALTH CARE EDUCATION/TRAINING PROGRAM
Payer: COMMERCIAL

## 2025-07-08 ENCOUNTER — ANESTHESIA (OUTPATIENT)
Dept: SURGERY | Facility: HOSPITAL | Age: 60
End: 2025-07-08
Payer: COMMERCIAL

## 2025-07-08 DIAGNOSIS — R00.0 TACHYCARDIA: ICD-10-CM

## 2025-07-08 DIAGNOSIS — R91.8 LUNG MASS: Primary | ICD-10-CM

## 2025-07-08 DIAGNOSIS — R91.1 PULMONARY NODULE: ICD-10-CM

## 2025-07-08 LAB
ABORH RETYPE: NORMAL
INDIRECT COOMBS: NORMAL
POCT GLUCOSE: 112 MG/DL (ref 70–110)
RH BLD: NORMAL
SPECIMEN OUTDATE: NORMAL

## 2025-07-08 PROCEDURE — 63600175 PHARM REV CODE 636 W HCPCS

## 2025-07-08 PROCEDURE — 88342 IMHCHEM/IMCYTCHM 1ST ANTB: CPT | Mod: 26,,, | Performed by: STUDENT IN AN ORGANIZED HEALTH CARE EDUCATION/TRAINING PROGRAM

## 2025-07-08 PROCEDURE — 0BBG4ZZ EXCISION OF LEFT UPPER LUNG LOBE, PERCUTANEOUS ENDOSCOPIC APPROACH: ICD-10-PCS | Performed by: STUDENT IN AN ORGANIZED HEALTH CARE EDUCATION/TRAINING PROGRAM

## 2025-07-08 PROCEDURE — 36000713 HC OR TIME LEV V EA ADD 15 MIN: Performed by: STUDENT IN AN ORGANIZED HEALTH CARE EDUCATION/TRAINING PROGRAM

## 2025-07-08 PROCEDURE — 25000003 PHARM REV CODE 250

## 2025-07-08 PROCEDURE — 71000033 HC RECOVERY, INTIAL HOUR: Performed by: STUDENT IN AN ORGANIZED HEALTH CARE EDUCATION/TRAINING PROGRAM

## 2025-07-08 PROCEDURE — 71000016 HC POSTOP RECOV ADDL HR: Performed by: STUDENT IN AN ORGANIZED HEALTH CARE EDUCATION/TRAINING PROGRAM

## 2025-07-08 PROCEDURE — 99900035 HC TECH TIME PER 15 MIN (STAT)

## 2025-07-08 PROCEDURE — 25000003 PHARM REV CODE 250: Performed by: STUDENT IN AN ORGANIZED HEALTH CARE EDUCATION/TRAINING PROGRAM

## 2025-07-08 PROCEDURE — 88305 TISSUE EXAM BY PATHOLOGIST: CPT | Mod: 26,,, | Performed by: STUDENT IN AN ORGANIZED HEALTH CARE EDUCATION/TRAINING PROGRAM

## 2025-07-08 PROCEDURE — 32674 THORACOSCOPY LYMPH NODE EXC: CPT | Mod: AS,,, | Performed by: PHYSICIAN ASSISTANT

## 2025-07-08 PROCEDURE — A4216 STERILE WATER/SALINE, 10 ML: HCPCS | Performed by: STUDENT IN AN ORGANIZED HEALTH CARE EDUCATION/TRAINING PROGRAM

## 2025-07-08 PROCEDURE — 25000003 PHARM REV CODE 250: Performed by: PHYSICIAN ASSISTANT

## 2025-07-08 PROCEDURE — 63600175 PHARM REV CODE 636 W HCPCS: Performed by: STUDENT IN AN ORGANIZED HEALTH CARE EDUCATION/TRAINING PROGRAM

## 2025-07-08 PROCEDURE — 71000015 HC POSTOP RECOV 1ST HR: Performed by: STUDENT IN AN ORGANIZED HEALTH CARE EDUCATION/TRAINING PROGRAM

## 2025-07-08 PROCEDURE — 88309 TISSUE EXAM BY PATHOLOGIST: CPT | Mod: 26,,, | Performed by: STUDENT IN AN ORGANIZED HEALTH CARE EDUCATION/TRAINING PROGRAM

## 2025-07-08 PROCEDURE — 07T74ZZ RESECTION OF THORAX LYMPHATIC, PERCUTANEOUS ENDOSCOPIC APPROACH: ICD-10-PCS | Performed by: STUDENT IN AN ORGANIZED HEALTH CARE EDUCATION/TRAINING PROGRAM

## 2025-07-08 PROCEDURE — 32674 THORACOSCOPY LYMPH NODE EXC: CPT | Mod: ,,, | Performed by: STUDENT IN AN ORGANIZED HEALTH CARE EDUCATION/TRAINING PROGRAM

## 2025-07-08 PROCEDURE — 88341 IMHCHEM/IMCYTCHM EA ADD ANTB: CPT | Mod: 26,,, | Performed by: STUDENT IN AN ORGANIZED HEALTH CARE EDUCATION/TRAINING PROGRAM

## 2025-07-08 PROCEDURE — 32669 THORACOSCOPY REMOVE SEGMENT: CPT | Mod: LT,,, | Performed by: STUDENT IN AN ORGANIZED HEALTH CARE EDUCATION/TRAINING PROGRAM

## 2025-07-08 PROCEDURE — 27000221 HC OXYGEN, UP TO 24 HOURS

## 2025-07-08 PROCEDURE — 25000242 PHARM REV CODE 250 ALT 637 W/ HCPCS: Performed by: PHYSICIAN ASSISTANT

## 2025-07-08 PROCEDURE — 8E0W4CZ ROBOTIC ASSISTED PROCEDURE OF TRUNK REGION, PERCUTANEOUS ENDOSCOPIC APPROACH: ICD-10-PCS | Performed by: STUDENT IN AN ORGANIZED HEALTH CARE EDUCATION/TRAINING PROGRAM

## 2025-07-08 PROCEDURE — 20600001 HC STEP DOWN PRIVATE ROOM

## 2025-07-08 PROCEDURE — 36000712 HC OR TIME LEV V 1ST 15 MIN: Performed by: STUDENT IN AN ORGANIZED HEALTH CARE EDUCATION/TRAINING PROGRAM

## 2025-07-08 PROCEDURE — 99499 UNLISTED E&M SERVICE: CPT | Mod: ,,, | Performed by: STUDENT IN AN ORGANIZED HEALTH CARE EDUCATION/TRAINING PROGRAM

## 2025-07-08 PROCEDURE — 32669 THORACOSCOPY REMOVE SEGMENT: CPT | Mod: AS,LT,, | Performed by: PHYSICIAN ASSISTANT

## 2025-07-08 PROCEDURE — 37000009 HC ANESTHESIA EA ADD 15 MINS: Performed by: STUDENT IN AN ORGANIZED HEALTH CARE EDUCATION/TRAINING PROGRAM

## 2025-07-08 PROCEDURE — 94761 N-INVAS EAR/PLS OXIMETRY MLT: CPT

## 2025-07-08 PROCEDURE — 86900 BLOOD TYPING SEROLOGIC ABO: CPT

## 2025-07-08 PROCEDURE — 37000008 HC ANESTHESIA 1ST 15 MINUTES: Performed by: STUDENT IN AN ORGANIZED HEALTH CARE EDUCATION/TRAINING PROGRAM

## 2025-07-08 PROCEDURE — 88342 IMHCHEM/IMCYTCHM 1ST ANTB: CPT | Mod: TC | Performed by: STUDENT IN AN ORGANIZED HEALTH CARE EDUCATION/TRAINING PROGRAM

## 2025-07-08 PROCEDURE — 27201423 OPTIME MED/SURG SUP & DEVICES STERILE SUPPLY: Performed by: STUDENT IN AN ORGANIZED HEALTH CARE EDUCATION/TRAINING PROGRAM

## 2025-07-08 PROCEDURE — 94640 AIRWAY INHALATION TREATMENT: CPT

## 2025-07-08 RX ORDER — ONDANSETRON 8 MG/1
8 TABLET, ORALLY DISINTEGRATING ORAL EVERY 8 HOURS PRN
Status: DISCONTINUED | OUTPATIENT
Start: 2025-07-08 | End: 2025-07-10 | Stop reason: HOSPADM

## 2025-07-08 RX ORDER — LIDOCAINE HYDROCHLORIDE 10 MG/ML
1 INJECTION, SOLUTION EPIDURAL; INFILTRATION; INTRACAUDAL; PERINEURAL ONCE
Status: DISCONTINUED | OUTPATIENT
Start: 2025-07-08 | End: 2025-07-08 | Stop reason: HOSPADM

## 2025-07-08 RX ORDER — HALOPERIDOL LACTATE 5 MG/ML
0.5 INJECTION, SOLUTION INTRAMUSCULAR EVERY 10 MIN PRN
Status: DISCONTINUED | OUTPATIENT
Start: 2025-07-08 | End: 2025-07-08 | Stop reason: HOSPADM

## 2025-07-08 RX ORDER — GLUCAGON 1 MG
1 KIT INJECTION
Status: DISCONTINUED | OUTPATIENT
Start: 2025-07-08 | End: 2025-07-08 | Stop reason: HOSPADM

## 2025-07-08 RX ORDER — ACETAMINOPHEN 500 MG
1000 TABLET ORAL
Status: DISCONTINUED | OUTPATIENT
Start: 2025-07-08 | End: 2025-07-08

## 2025-07-08 RX ORDER — ENOXAPARIN SODIUM 100 MG/ML
40 INJECTION SUBCUTANEOUS EVERY 24 HOURS
Status: DISCONTINUED | OUTPATIENT
Start: 2025-07-09 | End: 2025-07-10 | Stop reason: HOSPADM

## 2025-07-08 RX ORDER — OXYCODONE HYDROCHLORIDE 5 MG/1
5 TABLET ORAL EVERY 4 HOURS PRN
Status: DISCONTINUED | OUTPATIENT
Start: 2025-07-08 | End: 2025-07-10 | Stop reason: HOSPADM

## 2025-07-08 RX ORDER — BUPIVACAINE 13.3 MG/ML
INJECTION, SUSPENSION, LIPOSOMAL INFILTRATION
Status: DISCONTINUED | OUTPATIENT
Start: 2025-07-08 | End: 2025-07-08 | Stop reason: HOSPADM

## 2025-07-08 RX ORDER — DEXAMETHASONE SODIUM PHOSPHATE 4 MG/ML
INJECTION, SOLUTION INTRA-ARTICULAR; INTRALESIONAL; INTRAMUSCULAR; INTRAVENOUS; SOFT TISSUE
Status: DISCONTINUED | OUTPATIENT
Start: 2025-07-08 | End: 2025-07-08

## 2025-07-08 RX ORDER — CEFAZOLIN 2 G/1
2 INJECTION, POWDER, FOR SOLUTION INTRAMUSCULAR; INTRAVENOUS
Status: COMPLETED | OUTPATIENT
Start: 2025-07-08 | End: 2025-07-08

## 2025-07-08 RX ORDER — AMOXICILLIN 250 MG
1 CAPSULE ORAL 2 TIMES DAILY
Status: DISCONTINUED | OUTPATIENT
Start: 2025-07-08 | End: 2025-07-10 | Stop reason: HOSPADM

## 2025-07-08 RX ORDER — IPRATROPIUM BROMIDE AND ALBUTEROL SULFATE 2.5; .5 MG/3ML; MG/3ML
3 SOLUTION RESPIRATORY (INHALATION)
Status: DISCONTINUED | OUTPATIENT
Start: 2025-07-08 | End: 2025-07-08

## 2025-07-08 RX ORDER — POLYETHYLENE GLYCOL 3350 17 G/17G
17 POWDER, FOR SOLUTION ORAL DAILY
Status: DISCONTINUED | OUTPATIENT
Start: 2025-07-08 | End: 2025-07-10 | Stop reason: HOSPADM

## 2025-07-08 RX ORDER — PHENYLEPHRINE HYDROCHLORIDE 10 MG/ML
INJECTION INTRAVENOUS
Status: DISCONTINUED | OUTPATIENT
Start: 2025-07-08 | End: 2025-07-08

## 2025-07-08 RX ORDER — KETAMINE HCL IN 0.9 % NACL 50 MG/5 ML
SYRINGE (ML) INTRAVENOUS
Status: DISCONTINUED | OUTPATIENT
Start: 2025-07-08 | End: 2025-07-08

## 2025-07-08 RX ORDER — OXYCODONE HYDROCHLORIDE 10 MG/1
10 TABLET ORAL EVERY 4 HOURS PRN
Status: DISCONTINUED | OUTPATIENT
Start: 2025-07-08 | End: 2025-07-10 | Stop reason: HOSPADM

## 2025-07-08 RX ORDER — PROPOFOL 10 MG/ML
VIAL (ML) INTRAVENOUS
Status: DISCONTINUED | OUTPATIENT
Start: 2025-07-08 | End: 2025-07-08

## 2025-07-08 RX ORDER — LIDOCAINE HYDROCHLORIDE 20 MG/ML
INJECTION, SOLUTION EPIDURAL; INFILTRATION; INTRACAUDAL; PERINEURAL
Status: DISCONTINUED | OUTPATIENT
Start: 2025-07-08 | End: 2025-07-08

## 2025-07-08 RX ORDER — ONDANSETRON HYDROCHLORIDE 2 MG/ML
INJECTION, SOLUTION INTRAVENOUS
Status: DISCONTINUED | OUTPATIENT
Start: 2025-07-08 | End: 2025-07-08

## 2025-07-08 RX ORDER — FENTANYL CITRATE 50 UG/ML
INJECTION, SOLUTION INTRAMUSCULAR; INTRAVENOUS
Status: DISCONTINUED | OUTPATIENT
Start: 2025-07-08 | End: 2025-07-08

## 2025-07-08 RX ORDER — IPRATROPIUM BROMIDE AND ALBUTEROL SULFATE 2.5; .5 MG/3ML; MG/3ML
3 SOLUTION RESPIRATORY (INHALATION)
Status: DISCONTINUED | OUTPATIENT
Start: 2025-07-08 | End: 2025-07-10 | Stop reason: HOSPADM

## 2025-07-08 RX ORDER — CEFAZOLIN 2 G/1
2 INJECTION, POWDER, FOR SOLUTION INTRAMUSCULAR; INTRAVENOUS
Status: COMPLETED | OUTPATIENT
Start: 2025-07-08 | End: 2025-07-09

## 2025-07-08 RX ORDER — ROCURONIUM BROMIDE 10 MG/ML
INJECTION, SOLUTION INTRAVENOUS
Status: DISCONTINUED | OUTPATIENT
Start: 2025-07-08 | End: 2025-07-08

## 2025-07-08 RX ORDER — GABAPENTIN 300 MG/1
300 CAPSULE ORAL NIGHTLY
Status: DISCONTINUED | OUTPATIENT
Start: 2025-07-08 | End: 2025-07-10 | Stop reason: HOSPADM

## 2025-07-08 RX ORDER — BISACODYL 10 MG/1
10 SUPPOSITORY RECTAL DAILY PRN
Status: DISCONTINUED | OUTPATIENT
Start: 2025-07-08 | End: 2025-07-10 | Stop reason: HOSPADM

## 2025-07-08 RX ORDER — BUPIVACAINE HYDROCHLORIDE 2.5 MG/ML
INJECTION, SOLUTION EPIDURAL; INFILTRATION; INTRACAUDAL; PERINEURAL
Status: DISCONTINUED | OUTPATIENT
Start: 2025-07-08 | End: 2025-07-08 | Stop reason: HOSPADM

## 2025-07-08 RX ORDER — SODIUM CHLORIDE 9 MG/ML
INJECTION, SOLUTION INTRAMUSCULAR; INTRAVENOUS; SUBCUTANEOUS
Status: DISCONTINUED | OUTPATIENT
Start: 2025-07-08 | End: 2025-07-08 | Stop reason: HOSPADM

## 2025-07-08 RX ORDER — ACETAMINOPHEN 500 MG
1000 TABLET ORAL
Status: COMPLETED | OUTPATIENT
Start: 2025-07-08 | End: 2025-07-08

## 2025-07-08 RX ORDER — OXYCODONE HYDROCHLORIDE 5 MG/1
5 TABLET ORAL
Status: DISCONTINUED | OUTPATIENT
Start: 2025-07-08 | End: 2025-07-08 | Stop reason: HOSPADM

## 2025-07-08 RX ORDER — MIDAZOLAM HYDROCHLORIDE 1 MG/ML
INJECTION INTRAMUSCULAR; INTRAVENOUS
Status: DISCONTINUED | OUTPATIENT
Start: 2025-07-08 | End: 2025-07-08

## 2025-07-08 RX ORDER — HYDROMORPHONE HYDROCHLORIDE 1 MG/ML
0.2 INJECTION, SOLUTION INTRAMUSCULAR; INTRAVENOUS; SUBCUTANEOUS EVERY 5 MIN PRN
Status: DISCONTINUED | OUTPATIENT
Start: 2025-07-08 | End: 2025-07-08 | Stop reason: HOSPADM

## 2025-07-08 RX ORDER — ACETAMINOPHEN 325 MG/1
650 TABLET ORAL EVERY 8 HOURS
Status: DISCONTINUED | OUTPATIENT
Start: 2025-07-08 | End: 2025-07-10 | Stop reason: HOSPADM

## 2025-07-08 RX ORDER — DEXMEDETOMIDINE HYDROCHLORIDE 100 UG/ML
INJECTION, SOLUTION INTRAVENOUS
Status: DISCONTINUED | OUTPATIENT
Start: 2025-07-08 | End: 2025-07-08

## 2025-07-08 RX ORDER — METHOCARBAMOL 500 MG/1
500 TABLET, FILM COATED ORAL 4 TIMES DAILY
Status: DISCONTINUED | OUTPATIENT
Start: 2025-07-08 | End: 2025-07-10 | Stop reason: HOSPADM

## 2025-07-08 RX ADMIN — SODIUM CHLORIDE, SODIUM GLUCONATE, SODIUM ACETATE, POTASSIUM CHLORIDE, MAGNESIUM CHLORIDE, SODIUM PHOSPHATE, DIBASIC, AND POTASSIUM PHOSPHATE: .53; .5; .37; .037; .03; .012; .00082 INJECTION, SOLUTION INTRAVENOUS at 10:07

## 2025-07-08 RX ADMIN — HYDROMORPHONE HYDROCHLORIDE 0.2 MG: 1 INJECTION, SOLUTION INTRAMUSCULAR; INTRAVENOUS; SUBCUTANEOUS at 03:07

## 2025-07-08 RX ADMIN — ROCURONIUM BROMIDE 100 MG: 10 INJECTION, SOLUTION INTRAVENOUS at 10:07

## 2025-07-08 RX ADMIN — CEFAZOLIN 2 G: 2 INJECTION, POWDER, FOR SOLUTION INTRAMUSCULAR; INTRAVENOUS at 11:07

## 2025-07-08 RX ADMIN — PHENYLEPHRINE HYDROCHLORIDE 100 MCG: 10 INJECTION INTRAVENOUS at 11:07

## 2025-07-08 RX ADMIN — FENTANYL CITRATE 50 MCG: 50 INJECTION, SOLUTION INTRAMUSCULAR; INTRAVENOUS at 11:07

## 2025-07-08 RX ADMIN — PROPOFOL 150 MG: 10 INJECTION, EMULSION INTRAVENOUS at 10:07

## 2025-07-08 RX ADMIN — LIDOCAINE HYDROCHLORIDE 100 MG: 20 INJECTION, SOLUTION EPIDURAL; INFILTRATION; INTRACAUDAL; PERINEURAL at 10:07

## 2025-07-08 RX ADMIN — METHOCARBAMOL 500 MG: 500 TABLET ORAL at 06:07

## 2025-07-08 RX ADMIN — DEXAMETHASONE SODIUM PHOSPHATE 4 MG: 4 INJECTION, SOLUTION INTRAMUSCULAR; INTRAVENOUS at 10:07

## 2025-07-08 RX ADMIN — Medication 10 MG: at 01:07

## 2025-07-08 RX ADMIN — IPRATROPIUM BROMIDE AND ALBUTEROL SULFATE 3 ML: 2.5; .5 SOLUTION RESPIRATORY (INHALATION) at 08:07

## 2025-07-08 RX ADMIN — CEFAZOLIN 2 G: 2 INJECTION, POWDER, FOR SOLUTION INTRAMUSCULAR; INTRAVENOUS at 08:07

## 2025-07-08 RX ADMIN — SODIUM CHLORIDE, SODIUM LACTATE, POTASSIUM CHLORIDE, AND CALCIUM CHLORIDE: .6; .31; .03; .02 INJECTION, SOLUTION INTRAVENOUS at 10:07

## 2025-07-08 RX ADMIN — ACETAMINOPHEN 650 MG: 325 TABLET ORAL at 09:07

## 2025-07-08 RX ADMIN — GABAPENTIN 400 MG: 300 CAPSULE ORAL at 09:07

## 2025-07-08 RX ADMIN — ROCURONIUM BROMIDE 20 MG: 10 INJECTION, SOLUTION INTRAVENOUS at 11:07

## 2025-07-08 RX ADMIN — PHENYLEPHRINE HYDROCHLORIDE 0.3 MCG/KG/MIN: 10 INJECTION INTRAVENOUS at 12:07

## 2025-07-08 RX ADMIN — ROCURONIUM BROMIDE 20 MG: 10 INJECTION, SOLUTION INTRAVENOUS at 01:07

## 2025-07-08 RX ADMIN — MIDAZOLAM HYDROCHLORIDE 2 MG: 2 INJECTION, SOLUTION INTRAMUSCULAR; INTRAVENOUS at 10:07

## 2025-07-08 RX ADMIN — GABAPENTIN 300 MG: 300 CAPSULE ORAL at 08:07

## 2025-07-08 RX ADMIN — FENTANYL CITRATE 50 MCG: 50 INJECTION, SOLUTION INTRAMUSCULAR; INTRAVENOUS at 02:07

## 2025-07-08 RX ADMIN — DEXMEDETOMIDINE 12 MCG: 100 INJECTION, SOLUTION, CONCENTRATE INTRAVENOUS at 11:07

## 2025-07-08 RX ADMIN — Medication 30 MG: at 10:07

## 2025-07-08 RX ADMIN — HYDROMORPHONE HYDROCHLORIDE 0.2 MG: 1 INJECTION, SOLUTION INTRAMUSCULAR; INTRAVENOUS; SUBCUTANEOUS at 04:07

## 2025-07-08 RX ADMIN — PHENYLEPHRINE HYDROCHLORIDE 200 MCG: 10 INJECTION INTRAVENOUS at 12:07

## 2025-07-08 RX ADMIN — ACETAMINOPHEN 1000 MG: 500 TABLET ORAL at 09:07

## 2025-07-08 RX ADMIN — METHOCARBAMOL 500 MG: 500 TABLET ORAL at 09:07

## 2025-07-08 RX ADMIN — OXYCODONE HYDROCHLORIDE 10 MG: 10 TABLET ORAL at 04:07

## 2025-07-08 RX ADMIN — SENNOSIDES AND DOCUSATE SODIUM 1 TABLET: 50; 8.6 TABLET ORAL at 08:07

## 2025-07-08 RX ADMIN — Medication 10 MG: at 11:07

## 2025-07-08 RX ADMIN — OXYCODONE HYDROCHLORIDE 5 MG: 5 TABLET ORAL at 08:07

## 2025-07-08 RX ADMIN — DEXMEDETOMIDINE 8 MCG: 100 INJECTION, SOLUTION, CONCENTRATE INTRAVENOUS at 02:07

## 2025-07-08 RX ADMIN — FENTANYL CITRATE 100 MCG: 50 INJECTION, SOLUTION INTRAMUSCULAR; INTRAVENOUS at 10:07

## 2025-07-08 RX ADMIN — SUGAMMADEX 400 MG: 100 INJECTION, SOLUTION INTRAVENOUS at 02:07

## 2025-07-08 RX ADMIN — ONDANSETRON 4 MG: 2 INJECTION INTRAMUSCULAR; INTRAVENOUS at 02:07

## 2025-07-08 RX ADMIN — ROCURONIUM BROMIDE 20 MG: 10 INJECTION, SOLUTION INTRAVENOUS at 12:07

## 2025-07-08 NOTE — ANESTHESIA PROCEDURE NOTES
Intubation    Date/Time: 7/8/2025 10:49 AM    Performed by: Mikey Whelan MD  Authorized by: Sanford Rushing MD    Intubation:     Induction:  Intravenous    Intubated:  Postinduction    Mask Ventilation:  Easy mask    Attempts:  1    Attempted By:  Resident anesthesiologist    Method of Intubation:  Video laryngoscopy and fiberoptic    Blade:  Rodriguez 3    Laryngeal View Grade: Grade I - full view of cords      Difficult Airway Encountered?: No      Complications:  None    Airway Device:  Double lumen tube left    Airway Device Size:  37F    Style/Cuff Inflation:  Cuffed    Placement Verified By:  Capnometry and Fiber optic visualization    Complicating Factors:  None    Findings Post-Intubation:  BS equal bilateral

## 2025-07-08 NOTE — TRANSFER OF CARE
"Anesthesia Transfer of Care Note    Patient: Radha Ritchiescdaniel    Procedure(s) Performed: Procedure(s) (LRB):  XI ROBOTIC RATS, WITH LYMPHADENECTOMY- ZACHERY (Left)  XI ROBOTIC RATS,WITH SEGMENTECTOMY, LUNG- ZACHERY- APICOPOSTERIOR (Left)  BLOCK, NERVE, INTERCOSTAL, 2 OR MORE (Left)    Patient location: PACU    Anesthesia Type: general    Transport from OR: Transported from OR on 6-10 L/min O2 by face mask with adequate spontaneous ventilation    Post pain: adequate analgesia    Post assessment: no apparent anesthetic complications    Post vital signs: stable    Level of consciousness: awake    Nausea/Vomiting: no nausea/vomiting    Complications: none    Transfer of care protocol was followed      Last vitals: Visit Vitals  BP (!) 103/54 (BP Location: Left arm, Patient Position: Lying)   Pulse 88   Temp 36.5 °C (97.7 °F) (Temporal)   Resp 12   Ht 5' 8" (1.727 m)   Wt 63.3 kg (139 lb 8.8 oz)   LMP  (LMP Unknown)   SpO2 100%   Breastfeeding No   BMI 21.22 kg/m²     "

## 2025-07-08 NOTE — ANESTHESIA PROCEDURE NOTES
Arterial    Diagnosis: Lung mass    Patient location during procedure: done in OR    Staffing  Authorizing Provider: Sanford Rushing MD  Performing Provider: Mikey Whelan MD    Staffing  Performed by: Mikey Whelan MD  Authorized by: Sanford Rushing MD    Anesthesiologist was present at the time of the procedure.    Preanesthetic Checklist  Completed: patient identified, IV checked, site marked, risks and benefits discussed, surgical consent, monitors and equipment checked, pre-op evaluation, timeout performed and anesthesia consent givenArterial  Skin Prep: chlorhexidine gluconate and isopropyl alcohol  Local Infiltration: none  Location: radial    Catheter Size: 20 G  Catheter placement by Ultrasound guidance. Heme positive aspiration all ports.   Vessel Caliber: patent, compressibility normal  Vascular Doppler:  not done  Needle advanced into vessel with real time Ultrasound guidance.Insertion Attempts: 1  Assessment  Dressing: secured with tape and tegaderm  Patient: Tolerated well

## 2025-07-08 NOTE — NURSING TRANSFER
Nursing Transfer Note      7/8/2025   4:22 PM    Reason patient is being transferred: post procedure    Transfer To: [place] [id number]    Transfer via bed    Transfer with cardiac monitoring    Transported by RN and PCT    Order for Tele Monitor? Yes    Additional Lines: Chest Tube    Medicines sent: NO    Any special needs or follow-up needed: routine    Patient belongings transferred with patient: No    Chart send with patient: Yes    Notified: spouse    Patient reassessed at: 1615, 07/08/2025  1  Upon arrival to floor: patient oriented to room, call bell in reach, and bed in lowest position

## 2025-07-08 NOTE — OPERATIVE NOTE ADDENDUM
Certification of Assistant at Surgery       Surgery Date: 7/8/2025     Participating Surgeons:  Surgeons and Role:     * Vickey Chaudhari MD - Primary     * Laverne Painter PA-C - Assisting     * Petar Hassan MD - Assisting     * Rossi Jorgensen MD - Fellow    Procedures:  Procedure(s) (LRB):  XI ROBOTIC RATS, WITH LYMPHADENECTOMY- ZACHERY (Left)  XI ROBOTIC RATS,WITH SEGMENTECTOMY, LUNG- ZACHERY- APICOPOSTERIOR (Left)  BLOCK, NERVE, INTERCOSTAL, 2 OR MORE (Left)    Assistant Surgeon's Certification of Necessity:  I understand that section 1842 (b) (6) (d) of the Social Security Act generally prohibits Medicare Part B reasonable charge payment for the services of assistants at surgery in teaching hospitals when qualified residents are available to furnish such services. I certify that the services for which payment is claimed were medically necessary, and that no qualified resident was available to perform the services. I further understand that these services are subject to post-payment review by the Medicare carrier.      Laverne Painter PA-C    07/08/2025  4:57 PM

## 2025-07-08 NOTE — PLAN OF CARE
[facility] Plan of Care Note  Dx: lung mass    Shift Events: received pt from pacu around 1700  Pt s/p XI ROBOTIC RATS, WITH LYMPHADENECTOMY- ZACHERY (Left)  XI ROBOTIC RATS,WITH SEGMENTECTOMY, LUNG- ZACHERY- APICOPOSTERIOR (Left)  BLOCK, NERVE, INTERCOSTAL, 2 OR MORE (Left)    Goals of Care: see careplans    Neuro: A&Ox4    Vital Signs: VSS    Respiratory: 2L NC    Diet: regular    Is patient tolerating current diet? /    GTTS: /    Urine Output/Bowel Movement: no urine output yet. No Bm during shift    Drains/Tubes/Tube Feeds (include total output/shift): chest tube to L chest, water seal    Lines: 2 PIVs    Accuchecks:/    Skin: lap sites    Fall Risk Score: 8    Activity level? bedrest    Any scheduled procedures? /    Any safety concerns? /    Other: /    Problem: Adult Inpatient Plan of Care  Goal: Plan of Care Review  Outcome: Progressing  Goal: Patient-Specific Goal (Individualized)  Outcome: Progressing  Goal: Absence of Hospital-Acquired Illness or Injury  Outcome: Progressing  Goal: Optimal Comfort and Wellbeing  Outcome: Progressing  Goal: Readiness for Transition of Care  Outcome: Progressing     Problem: Wound  Goal: Optimal Coping  Outcome: Progressing  Goal: Optimal Functional Ability  Outcome: Progressing  Goal: Absence of Infection Signs and Symptoms  Outcome: Progressing  Goal: Improved Oral Intake  Outcome: Progressing  Goal: Optimal Pain Control and Function  Outcome: Progressing  Goal: Skin Health and Integrity  Outcome: Progressing  Goal: Optimal Wound Healing  Outcome: Progressing     Problem: Fall Injury Risk  Goal: Absence of Fall and Fall-Related Injury  Outcome: Progressing     Problem: Pain Acute  Goal: Optimal Pain Control and Function  Outcome: Progressing

## 2025-07-08 NOTE — BRIEF OP NOTE
[facility] - Surgery (2nd Fl)  Brief Operative Note    SUMMARY     Surgery Date: 7/8/2025     Surgeons and Role:     * Vickey Chaudhari MD - Primary     * Laverne Painter PA-C - Assisting     * Petar Hassan MD - Assisting     * Rossi Jorgensen MD - Fellow    Pre-op Diagnosis:  Lung mass [R91.8]    Post-op Diagnosis:  Post-Op Diagnosis Codes:     * Lung mass [R91.8]    Procedure(s) (LRB):  XI ROBOTIC RATS, WITH LYMPHADENECTOMY- ZACHERY (Left)  XI ROBOTIC RATS,WITH SEGMENTECTOMY, LUNG- ZACHERY- APICOPOSTERIOR (Left)  BLOCK, NERVE, INTERCOSTAL, 2 OR MORE (Left)    Anesthesia: General    Implants: 24Fr Aj drain    Operative Findings: ZACHERY Apicoposterior segmentectomy performed. Mass identified and present within specimen. MLND with removal of nodes from levels 5, 6, 7, 9, 10, 11 (sump), 12, and 13.     Estimated Blood Loss: Minimal         Specimens:   Specimen (24h ago, onward)       Start     Ordered    07/08/25 1405  Specimen to Pathology Pulmonary and Thoracic  RELEASE UPON ORDERING        References:    Click here for ordering Quick Tip   Question:  Release to patient  Answer:  Immediate    07/08/25 1405                  ID Type Source Tests Collected by Time Destination   1 : Left Level 9 Tissue Lymph Node(s), Mediastinal, Left SPECIMEN TO PATHOLOGY Vickey Chaudhari MD 7/8/2025 1201    2 : Level 7 Tissue Lymph Node(s), Mediastinal, Left SPECIMEN TO PATHOLOGY Vickey Chaudhari MD 7/8/2025 1201    3 : Left Level 11 Tissue Lymph Node(s), Mediastinal, Left SPECIMEN TO PATHOLOGY Vickey Chaudhari MD 7/8/2025 1201    4 : Left Level 12 Tissue Lymph Node(s), Mediastinal, Left SPECIMEN TO PATHOLOGY Vickey Chaudhari MD 7/8/2025 1201    5 : Left Level 13 Tissue Lymph Node(s), Mediastinal, Left SPECIMEN TO PATHOLOGY Vickey Chaudhari MD 7/8/2025 1237    6 : Left Level 10 Tissue Lymph Node(s), Mediastinal, Left SPECIMEN TO PATHOLOGY Vickey Chaudhari MD 7/8/2025 1353    7 : Left Level 6 Tissue Lymph Node(s),  Mediastinal, Left SPECIMEN TO PATHOLOGY Vickey Chaudhari MD 7/8/2025 1400    8 : Left Level 5 Tissue Lymph Node(s), Mediastinal, Left SPECIMEN TO PATHOLOGY Vickey Chaudhari MD 7/8/2025 1400    9 : Left Upper Apicoposterior Segmentectomy Tissue Lung, Left Upper Lobe SPECIMEN TO PATHOLOGY Vickey Chaudhari MD 7/8/2025 1402        Rossi Jorgensen MD  PGY-6 CTS Fellow

## 2025-07-09 LAB
ABSOLUTE EOSINOPHIL (OHS): 0 K/UL
ABSOLUTE MONOCYTE (OHS): 0.84 K/UL (ref 0.3–1)
ABSOLUTE NEUTROPHIL COUNT (OHS): 10.57 K/UL (ref 1.8–7.7)
ANION GAP (OHS): 9 MMOL/L (ref 8–16)
ANION GAP (OHS): 9 MMOL/L (ref 8–16)
BASOPHILS # BLD AUTO: 0.02 K/UL
BASOPHILS NFR BLD AUTO: 0.2 %
BUN SERPL-MCNC: 11 MG/DL (ref 6–20)
BUN SERPL-MCNC: 12 MG/DL (ref 6–20)
CALCIUM SERPL-MCNC: 8.3 MG/DL (ref 8.7–10.5)
CALCIUM SERPL-MCNC: 8.5 MG/DL (ref 8.7–10.5)
CHLORIDE SERPL-SCNC: 106 MMOL/L (ref 95–110)
CHLORIDE SERPL-SCNC: 106 MMOL/L (ref 95–110)
CO2 SERPL-SCNC: 20 MMOL/L (ref 23–29)
CO2 SERPL-SCNC: 21 MMOL/L (ref 23–29)
CREAT SERPL-MCNC: 0.7 MG/DL (ref 0.5–1.4)
CREAT SERPL-MCNC: 0.7 MG/DL (ref 0.5–1.4)
ERYTHROCYTE [DISTWIDTH] IN BLOOD BY AUTOMATED COUNT: 13.3 % (ref 11.5–14.5)
GFR SERPLBLD CREATININE-BSD FMLA CKD-EPI: >60 ML/MIN/1.73/M2
GFR SERPLBLD CREATININE-BSD FMLA CKD-EPI: >60 ML/MIN/1.73/M2
GLUCOSE SERPL-MCNC: 122 MG/DL (ref 70–110)
GLUCOSE SERPL-MCNC: 122 MG/DL (ref 70–110)
HCT VFR BLD AUTO: 39.8 % (ref 37–48.5)
HGB BLD-MCNC: 12.8 GM/DL (ref 12–16)
IMM GRANULOCYTES # BLD AUTO: 0.09 K/UL (ref 0–0.04)
IMM GRANULOCYTES NFR BLD AUTO: 0.7 % (ref 0–0.5)
LYMPHOCYTES # BLD AUTO: 1.22 K/UL (ref 1–4.8)
MCH RBC QN AUTO: 28.8 PG (ref 27–31)
MCHC RBC AUTO-ENTMCNC: 32.2 G/DL (ref 32–36)
MCV RBC AUTO: 90 FL (ref 82–98)
NUCLEATED RBC (/100WBC) (OHS): 0 /100 WBC
PLATELET # BLD AUTO: 343 K/UL (ref 150–450)
PMV BLD AUTO: 10.3 FL (ref 9.2–12.9)
POTASSIUM SERPL-SCNC: 4 MMOL/L (ref 3.5–5.1)
POTASSIUM SERPL-SCNC: 4 MMOL/L (ref 3.5–5.1)
RBC # BLD AUTO: 4.44 M/UL (ref 4–5.4)
RELATIVE EOSINOPHIL (OHS): 0 %
RELATIVE LYMPHOCYTE (OHS): 9.6 % (ref 18–48)
RELATIVE MONOCYTE (OHS): 6.6 % (ref 4–15)
RELATIVE NEUTROPHIL (OHS): 82.9 % (ref 38–73)
SODIUM SERPL-SCNC: 135 MMOL/L (ref 136–145)
SODIUM SERPL-SCNC: 136 MMOL/L (ref 136–145)
WBC # BLD AUTO: 12.74 K/UL (ref 3.9–12.7)

## 2025-07-09 PROCEDURE — 94640 AIRWAY INHALATION TREATMENT: CPT

## 2025-07-09 PROCEDURE — 25000003 PHARM REV CODE 250: Performed by: STUDENT IN AN ORGANIZED HEALTH CARE EDUCATION/TRAINING PROGRAM

## 2025-07-09 PROCEDURE — 63600175 PHARM REV CODE 636 W HCPCS: Performed by: PHYSICIAN ASSISTANT

## 2025-07-09 PROCEDURE — 97140 MANUAL THERAPY 1/> REGIONS: CPT

## 2025-07-09 PROCEDURE — 80048 BASIC METABOLIC PNL TOTAL CA: CPT | Performed by: STUDENT IN AN ORGANIZED HEALTH CARE EDUCATION/TRAINING PROGRAM

## 2025-07-09 PROCEDURE — 93010 ELECTROCARDIOGRAM REPORT: CPT | Mod: ,,, | Performed by: INTERNAL MEDICINE

## 2025-07-09 PROCEDURE — 97535 SELF CARE MNGMENT TRAINING: CPT

## 2025-07-09 PROCEDURE — 97161 PT EVAL LOW COMPLEX 20 MIN: CPT

## 2025-07-09 PROCEDURE — 25000003 PHARM REV CODE 250: Performed by: NURSE PRACTITIONER

## 2025-07-09 PROCEDURE — 99024 POSTOP FOLLOW-UP VISIT: CPT | Mod: ,,, | Performed by: STUDENT IN AN ORGANIZED HEALTH CARE EDUCATION/TRAINING PROGRAM

## 2025-07-09 PROCEDURE — 25000003 PHARM REV CODE 250: Performed by: PHYSICIAN ASSISTANT

## 2025-07-09 PROCEDURE — 25000242 PHARM REV CODE 250 ALT 637 W/ HCPCS: Performed by: PHYSICIAN ASSISTANT

## 2025-07-09 PROCEDURE — 99900035 HC TECH TIME PER 15 MIN (STAT)

## 2025-07-09 PROCEDURE — 36415 COLL VENOUS BLD VENIPUNCTURE: CPT | Performed by: STUDENT IN AN ORGANIZED HEALTH CARE EDUCATION/TRAINING PROGRAM

## 2025-07-09 PROCEDURE — 25000003 PHARM REV CODE 250

## 2025-07-09 PROCEDURE — 94761 N-INVAS EAR/PLS OXIMETRY MLT: CPT

## 2025-07-09 PROCEDURE — 63600175 PHARM REV CODE 636 W HCPCS: Performed by: STUDENT IN AN ORGANIZED HEALTH CARE EDUCATION/TRAINING PROGRAM

## 2025-07-09 PROCEDURE — 20600001 HC STEP DOWN PRIVATE ROOM

## 2025-07-09 PROCEDURE — 97165 OT EVAL LOW COMPLEX 30 MIN: CPT

## 2025-07-09 PROCEDURE — 93005 ELECTROCARDIOGRAM TRACING: CPT

## 2025-07-09 PROCEDURE — 85025 COMPLETE CBC W/AUTO DIFF WBC: CPT

## 2025-07-09 RX ORDER — CETIRIZINE HYDROCHLORIDE 10 MG/1
10 TABLET ORAL DAILY
Status: DISCONTINUED | OUTPATIENT
Start: 2025-07-09 | End: 2025-07-10 | Stop reason: HOSPADM

## 2025-07-09 RX ORDER — METOPROLOL TARTRATE 25 MG/1
12.5 TABLET ORAL 2 TIMES DAILY
Status: DISCONTINUED | OUTPATIENT
Start: 2025-07-09 | End: 2025-07-10 | Stop reason: HOSPADM

## 2025-07-09 RX ORDER — RALOXIFENE HYDROCHLORIDE 60 MG/1
60 TABLET, FILM COATED ORAL NIGHTLY
Status: DISCONTINUED | OUTPATIENT
Start: 2025-07-09 | End: 2025-07-10 | Stop reason: HOSPADM

## 2025-07-09 RX ORDER — MUPIROCIN 20 MG/G
OINTMENT TOPICAL 2 TIMES DAILY
Status: DISCONTINUED | OUTPATIENT
Start: 2025-07-09 | End: 2025-07-10 | Stop reason: HOSPADM

## 2025-07-09 RX ADMIN — IPRATROPIUM BROMIDE AND ALBUTEROL SULFATE 3 ML: 2.5; .5 SOLUTION RESPIRATORY (INHALATION) at 08:07

## 2025-07-09 RX ADMIN — METOPROLOL TARTRATE 12.5 MG: 25 TABLET, FILM COATED ORAL at 09:07

## 2025-07-09 RX ADMIN — RALOXIFENE HYDROCHLORIDE 60 MG: 60 TABLET, FILM COATED ORAL at 09:07

## 2025-07-09 RX ADMIN — MUPIROCIN: 20 OINTMENT TOPICAL at 09:07

## 2025-07-09 RX ADMIN — SENNOSIDES AND DOCUSATE SODIUM 1 TABLET: 50; 8.6 TABLET ORAL at 09:07

## 2025-07-09 RX ADMIN — ACETAMINOPHEN 650 MG: 325 TABLET ORAL at 05:07

## 2025-07-09 RX ADMIN — METHOCARBAMOL 500 MG: 500 TABLET ORAL at 09:07

## 2025-07-09 RX ADMIN — IPRATROPIUM BROMIDE AND ALBUTEROL SULFATE 3 ML: 2.5; .5 SOLUTION RESPIRATORY (INHALATION) at 03:07

## 2025-07-09 RX ADMIN — CEFAZOLIN 2 G: 2 INJECTION, POWDER, FOR SOLUTION INTRAMUSCULAR; INTRAVENOUS at 04:07

## 2025-07-09 RX ADMIN — ACETAMINOPHEN 650 MG: 325 TABLET ORAL at 02:07

## 2025-07-09 RX ADMIN — OXYCODONE HYDROCHLORIDE 10 MG: 10 TABLET ORAL at 12:07

## 2025-07-09 RX ADMIN — METHOCARBAMOL 500 MG: 500 TABLET ORAL at 04:07

## 2025-07-09 RX ADMIN — CETIRIZINE HYDROCHLORIDE 10 MG: 10 TABLET, FILM COATED ORAL at 11:07

## 2025-07-09 RX ADMIN — OXYCODONE HYDROCHLORIDE 10 MG: 10 TABLET ORAL at 07:07

## 2025-07-09 RX ADMIN — ACETAMINOPHEN 650 MG: 325 TABLET ORAL at 09:07

## 2025-07-09 RX ADMIN — METHOCARBAMOL 500 MG: 500 TABLET ORAL at 08:07

## 2025-07-09 RX ADMIN — GABAPENTIN 300 MG: 300 CAPSULE ORAL at 09:07

## 2025-07-09 RX ADMIN — POLYETHYLENE GLYCOL 3350 17 G: 17 POWDER, FOR SOLUTION ORAL at 08:07

## 2025-07-09 RX ADMIN — ENOXAPARIN SODIUM 40 MG: 40 INJECTION SUBCUTANEOUS at 04:07

## 2025-07-09 RX ADMIN — OXYCODONE HYDROCHLORIDE 5 MG: 5 TABLET ORAL at 09:07

## 2025-07-09 RX ADMIN — SENNOSIDES AND DOCUSATE SODIUM 1 TABLET: 50; 8.6 TABLET ORAL at 08:07

## 2025-07-09 RX ADMIN — OXYCODONE HYDROCHLORIDE 5 MG: 5 TABLET ORAL at 12:07

## 2025-07-09 RX ADMIN — METHOCARBAMOL 500 MG: 500 TABLET ORAL at 12:07

## 2025-07-09 NOTE — ASSESSMENT & PLAN NOTE
Patient is s/p L RATS with segmentectomy on 7/8.    - Regular diet  - Chest tube to water seal  - Daily CXR  - MMPC  - DVT ppx  - IS. OOB. Ambulate in halls

## 2025-07-09 NOTE — PLAN OF CARE
Problem: Occupational Therapy  Goal: Occupational Therapy Goal  Outcome: Met       OT eval complete. Pt is independent with ADLs & functional mobility/transfers & will not require further acute OT services at this time.

## 2025-07-09 NOTE — CARE UPDATE
Unit [facility] Care Support Interaction      I have reviewed the chart of Radha Mario who is hospitalized for Lung mass. The patient is currently located in the following unit: [facility]        I have assisted the primary physician in management of the following:      MRSA Decolonization - Mupirocin ordered          JAYLENE Koehler  Unit Based [facility]

## 2025-07-09 NOTE — PLAN OF CARE
Problem: Physical Therapy  Goal: Physical Therapy Goal  Description: No goals established at this time- see nicki 7/9  Outcome: Met

## 2025-07-09 NOTE — PROGRESS NOTES
[facility]  Thoracic Surgery  Progress Note    Subjective:     History of Present Illness:  No notes on file    Post-Op Info:  Procedure(s) (LRB):  XI ROBOTIC RATS, WITH LYMPHADENECTOMY- ZACHERY (Left)  XI ROBOTIC RATS,WITH SEGMENTECTOMY, LUNG- ZACHERY- APICOPOSTERIOR (Left)  BLOCK, NERVE, INTERCOSTAL, 2 OR MORE (Left)   1 Day Post-Op     Interval History: Naeon. Appropriate sats on RA. Chest tube without air leak. Pain well controlled.     Medications:  Continuous Infusions:  Scheduled Meds:   acetaminophen  650 mg Oral Q8H    albuterol-ipratropium  3 mL Nebulization Q6H WAKE    cetirizine  10 mg Oral Daily    enoxparin  40 mg Subcutaneous Q24H (prophylaxis, 1700)    gabapentin  300 mg Oral QHS    methocarbamoL  500 mg Oral QID    polyethylene glycol  17 g Oral Daily    raloxifene  60 mg Oral QHS    senna-docusate  1 tablet Oral BID     PRN Meds:  Current Facility-Administered Medications:     bisacodyL, 10 mg, Rectal, Daily PRN    ondansetron, 8 mg, Oral, Q8H PRN    oxyCODONE, 5 mg, Oral, Q4H PRN    oxyCODONE, 10 mg, Oral, Q4H PRN     Review of patient's allergies indicates:  No Known Allergies  Objective:     Vital Signs (Most Recent):  Temp: 98.2 °F (36.8 °C) (07/09/25 0746)  Pulse: 110 (07/09/25 1106)  Resp: 20 (07/09/25 0825)  BP: 98/62 (07/09/25 0746)  SpO2: 96 % (07/09/25 0825) Vital Signs (24h Range):  Temp:  [97.5 °F (36.4 °C)-98.3 °F (36.8 °C)] 98.2 °F (36.8 °C)  Pulse:  [] 110  Resp:  [9-20] 20  SpO2:  [95 %-100 %] 96 %  BP: ()/(54-73) 98/62     Intake/Output - Last 3 Shifts         07/07 0700  07/08 0659 07/08 0700  07/09 0659 07/09 0700  07/10 0659    P.O.   118    Total Intake(mL/kg)   118 (1.9)    Urine (mL/kg/hr)  0     Stool  0     Chest Tube  160     Total Output  160     Net  -160 +118           Unmeasured Urine Occurrence  3 x     Unmeasured Stool Occurrence  0 x             SpO2: 96 %        Physical Exam  Constitutional:       General: She is not in acute distress.     Appearance:  Normal appearance. She is not ill-appearing.   HENT:      Head: Normocephalic and atraumatic.   Eyes:      Extraocular Movements: Extraocular movements intact.      Conjunctiva/sclera: Conjunctivae normal.   Cardiovascular:      Rate and Rhythm: Normal rate.   Pulmonary:      Effort: Pulmonary effort is normal. No respiratory distress.      Comments: L chest tube to water seal. Tidaling. No air leak  Musculoskeletal:      Cervical back: Normal range of motion.   Skin:     General: Skin is warm and dry.   Neurological:      General: No focal deficit present.      Mental Status: She is alert and oriented to person, place, and time.            Significant Labs:  All pertinent labs from the last 24 hours have been reviewed.    Significant Diagnostics:  CXR: I have reviewed all pertinent results/findings within the past 24 hours    VTE Risk Mitigation (From admission, onward)           Ordered     enoxaparin injection 40 mg  Every 24 hours         07/08/25 1655     Place KATIE hose  Until discontinued         07/08/25 0839     Place sequential compression device  Until discontinued         07/08/25 0839                  Assessment/Plan:     * Lung mass   Patient is s/p L RATS with segmentectomy on 7/8.    - Regular diet  - Chest tube to water seal  - Daily CXR  - MMPC  - DVT ppx  - IS. OOB. Ambulate in [place]        Lu Narvaez MD  Thoracic Surgery  [facility]

## 2025-07-09 NOTE — PT/OT/SLP EVAL
Occupational Therapy   Co-Evaluation/Discharge  Co-eval/treatment performed due to patient's multiple deficits requiring two skilled therapists to appropriately and safely assess patient's strength and endurance while facilitating functional tasks in addition to accommodating for patient's activity tolerance.        Name: Radha Mario  MRN: [id number]  Admitting Diagnosis: Lung mass  Recent Surgery: Procedure(s) (LRB):  XI ROBOTIC RATS, WITH LYMPHADENECTOMY- ZACHERY (Left)  XI ROBOTIC RATS,WITH SEGMENTECTOMY, LUNG- ZACHERY- APICOPOSTERIOR (Left)  BLOCK, NERVE, INTERCOSTAL, 2 OR MORE (Left) 1 Day Post-Op    Recommendations:     Discharge Recommendations: No Therapy Indicated  Discharge Equipment Recommendations:  none  Barriers to discharge:  None    Assessment:     Radha Mario is a 59 y.o. female with a medical diagnosis of Lung mass.      Pt is independent with ADLs & functional mobility/transfers. Therefore, pt will not require further acute OT services at this time.    Plan:     Patient to be seen   to address the above listed problems via    Plan of Care Expires: 07/23/25  Plan of Care Reviewed with: patient    Subjective     Chief Complaint: none   Patient/Family Comments/goals: regain PLOF    Occupational Profile:  Living Environment: pt lives in a 2  with spouse, 1 ROSI. Pt resides on the 1st floor. Bathroom: walk-in shower  Previous level of function: Independent  Roles and Routines: pt enjoys spending time with her grandchildren  Equipment Used at Home: none  Assistance upon Discharge: spouse    Pain/Comfort:  Pain Rating 1: 5/10  Location - Side 1: Left  Location 1: chest  Pain Addressed 1: Distraction    Patients cultural, spiritual, Sabianism conflicts given the current situation: no    Objective:     Communicated with: nurse prior to session.  Patient found HOB elevated with chest tube upon OT entry to room.    General Precautions: Standard,    Orthopedic Precautions: N/A  Braces: N/A  Respiratory  Status: Room air    Occupational Performance:    Bed Mobility:    Patient completed Scooting to EOB  with supervision    Patient completed Supine to Sit with supervision    Functional Mobility/Transfers:  Patient completed 2 Sit <> Stand Transfers with supervision  with  no assistive device   1st STS (from EOB)  2nd STS (from toilet)    Patient completed Toilet Transfer Step Transfer technique with supervision with  no AD    Functional Mobility:  pt ambulated in room & hallway to simulate household environment & community distances to improve overall strength, coordination, activity tolerance & safety awareness required for participation/independence wit MRADLs/IADLs  Pt ambulated approximately 250+ ft with supervision using no AD   No LOB/SOB noted    Activities of Daily Living:  Lower Body Dressing: supervision to dof/don sock using figure 4 technique while seated EOB  No increase in pain    Toileting: independence     Cognitive/Visual Perceptual:  Cognitive/Psychosocial Skills:     -       Oriented to: Person, Place, Time, and Situation   -       Follows Commands/attention:Follows multistep  commands    Physical Exam:  Upper Extremity Range of Motion:     -       Right Upper Extremity: WFL  -       Left Upper Extremity: WFL  Upper Extremity Strength:    -       Right Upper Extremity: WFL  -       Left Upper Extremity: WFL   Strength:    -       Right Upper Extremity: WFL  -       Left Upper Extremity: WFL  Fine Motor Coordination:    -       Intact  Left hand thumb/finger opposition skills and Right hand thumb/finger opposition skills  Gross motor coordination:   WFL    AMPAC 6 Click ADL:  AMPAC Total Score: 24    Treatment & Education:  -Education on task modification to maximize safety and (I) during bed mobility, ADLs, IADLs and mobility/transfers  -Education on importance of OOB activity to improve/maintain overall strength, activity tolerance and promote recovery  -Pt educated to call for assistance and  to transfer with hospital staff only  -Provided education regarding role of OT & POCwith pt verbalizing understanding.   Pt had no further questions & when asked whether there were any concerns pt reported none.     Patient left sitting edge of bed with call button in reach and PT & spouse present    GOALS:   Multidisciplinary Problems       Occupational Therapy Goals       Not on file              Multidisciplinary Problems (Resolved)          Problem: Occupational Therapy    Goal Priority Disciplines Outcome Interventions   Occupational Therapy Goal   (Resolved)     OT, PT/OT Met                        History:     Past Medical History:   Diagnosis Date    Abnormal Pap smear of cervix 01/08/2018    Hpv +      Anxiety     Breast cancer 2001    Left Lumpectomy     Menopause 2001         Past Surgical History:   Procedure Laterality Date    AUGMENTATION OF BREAST  2000    BREAST LUMPECTOMY  2001    then radiation    COLD KNIFE CONIZATION OF CERVIX N/A 4/19/2023    Procedure: CONE BIOPSY, CERVIX, USING COLD KNIFE;  Surgeon: Татьяна Motta MD;  Location: [facility];  Service: OB/GYN;  Laterality: N/A;    INJECTION OF ANESTHETIC AGENT AROUND MULTIPLE INTERCOSTAL NERVES Left 7/8/2025    Procedure: BLOCK, NERVE, INTERCOSTAL, 2 OR MORE;  Surgeon: Vickey Chaudhari MD;  Location: [address];  Service: Cardiothoracic;  Laterality: Left;    ROBOTIC BRONCHOSCOPY N/A 6/3/2025    Procedure: ROBOTIC BRONCHOSCOPY;  Surgeon: Fior Shepard MD;  Location: [address];  Service: Pulmonary;  Laterality: N/A;    XI ROBOTIC RATS, WITH LYMPHADENECTOMY Left 7/8/2025    Procedure: XI ROBOTIC RATS, WITH LYMPHADENECTOMY- ZACHERY;  Surgeon: Vickey Chaudhari MD;  Location: [address];  Service: Cardiothoracic;  Laterality: Left;    XI ROBOTIC RATS,WITH LOBECTOMY,LUNG Left 7/8/2025    Procedure: XI ROBOTIC RATS,WITH SEGMENTECTOMY, LUNG- ZACHERY- APICOPOSTERIOR;  Surgeon: Vickey Chaudhari MD;  Location: [address];  Service:  Cardiothoracic;  Laterality: Left;       Time Tracking:     OT Date of Treatment: 07/09/25  OT Start Time: 1340  OT Stop Time: 1358  OT Total Time (min): 18 min    Billable Minutes:Evaluation 8  Self Care/Home Management 10    7/9/2025

## 2025-07-09 NOTE — PT/OT/SLP EVAL
"Physical Therapy Evaluation and Discharge Note with OT    Patient Name:  Radha Mario   MRN:  [id number]    Patient required co-tx with OT secondary to need for multiple set of skilled hands to provide safest therapy and best outcomes.      Recommendations:     Discharge Recommendations: No Therapy Indicated  Discharge Equipment Recommendations: none   Barriers to discharge: None    Assessment:     Radha Mario is a 59 y.o. female admitted with a medical diagnosis of Lung mass. Pt is ambulating with supervision and no AD. She has no LOB and her pain is well controlled. Pt reports posterior medial scapular pain radiating into her neck. Writing therapist performed soft tissue mobilization and trigger point release of L levator scapulae mm. Pt reports relief afterwards and heating pack applied.  At this time, patient is functioning at their prior level of function and does not require further acute PT services.     Recent Surgery: Procedure(s) (LRB):  XI ROBOTIC RATS, WITH LYMPHADENECTOMY- ZACHERY (Left)  XI ROBOTIC RATS,WITH SEGMENTECTOMY, LUNG- ZACHERY- APICOPOSTERIOR (Left)  BLOCK, NERVE, INTERCOSTAL, 2 OR MORE (Left) 1 Day Post-Op    Plan:     During this hospitalization, patient does not require further acute PT services.  Please re-consult if situation changes.      Subjective     Chief Complaint: Pain  Patient/Family Comments/goals: "My neck is really bothering me"  Pain/Comfort:  Pain Rating 1: 5/10  Location - Side 1: Left  Location - Orientation 1: generalized  Location 1: chest  Pain Addressed 1: Distraction  Pain Rating Post-Intervention 1:  (unrated)  Pain Rating 2:  (unrated)  Location - Side 2: Left  Location - Orientation 2: generalized  Location 2: neck  Pain Addressed 2: Distraction, Reposition  Pain Rating Post-Intervention 2:  (unrated)    Patients cultural, spiritual, Sikhism conflicts given the current situation: no    Living Environment:  Pt lives with her spouse in a 2SH w/ 1 ROSI. Pt's " bedroom and bathroom are on the 1st level, pt never goes upstairs.  Prior to admission, patients level of function was (I) with functional mobility using no DME.  Equipment used at home: none.  DME owned (not currently used): none.  Upon discharge, patient will have assistance from family.    Objective:     Communicated with RN prior to session.  Patient found HOB elevated with telemetry, chest tube upon PT entry to room.    General Precautions: Standard, fall    Orthopedic Precautions:N/A   Braces: N/A  Respiratory Status: Room air    Exams:  Cognitive Exam:  Patient is oriented to Person, Place, Time, and Situation  Postural Exam:  Patient presented with the following abnormalities: -       No postural abnormalities identified  Sensation:    -       Intact  light/touch B LEs  RLE ROM: WNL  RLE Strength: WFL  LLE ROM: WNL  LLE Strength: WFL    Functional Mobility:  Bed Mobility:     Supine to Sit: supervision  Sit to Supine: supervision  Transfers:     Sit to Stand:  supervision with no AD  Toilet Transfer: supervision with  no AD  using  Step Transfer  Gait: Pt ambulated in hallway ~250' with supervision and no AD. Pt demos a steady gait with no LOB. Pain well controlled while ambulating.    AM-PAC 6 CLICK MOBILITY  Total Score:24       Treatment and Education:  PT assisted with functional mobility as noted above.  Pt reports posterior medial scapular pain radiating into her neck. Writing therapist performed soft tissue mobilization and trigger point release of L levator scapulae mm.  Discussed d/c from acute PT services, pt agreeable.  PT instructed pt to ambulate with staff at least 3x daily to prevent functional decline during hospital stay.      AM-PAC 6 CLICK MOBILITY  Total Score:24     Patient left HOB elevated with all lines intact, call button in reach, RN notified, and spouse present.    GOALS:   Multidisciplinary Problems       Physical Therapy Goals       Not on file              Multidisciplinary  Problems (Resolved)          Problem: Physical Therapy    Goal Priority Disciplines Outcome Interventions   Physical Therapy Goal   (Resolved)     PT, PT/OT Met    Description: No goals established at this time- see eval 7/9                       DME Justifications:  No DME recommended requiring DME justifications    History:     Past Medical History:   Diagnosis Date    Abnormal Pap smear of cervix 01/08/2018    Hpv +      Anxiety     Breast cancer 2001    Left Lumpectomy     Menopause 2001       Past Surgical History:   Procedure Laterality Date    AUGMENTATION OF BREAST  2000    BREAST LUMPECTOMY  2001    then radiation    COLD KNIFE CONIZATION OF CERVIX N/A 4/19/2023    Procedure: CONE BIOPSY, CERVIX, USING COLD KNIFE;  Surgeon: Татьяна Motta MD;  Location: [facility];  Service: OB/GYN;  Laterality: N/A;    INJECTION OF ANESTHETIC AGENT AROUND MULTIPLE INTERCOSTAL NERVES Left 7/8/2025    Procedure: BLOCK, NERVE, INTERCOSTAL, 2 OR MORE;  Surgeon: Vickey Chaudhari MD;  Location: [address];  Service: Cardiothoracic;  Laterality: Left;    ROBOTIC BRONCHOSCOPY N/A 6/3/2025    Procedure: ROBOTIC BRONCHOSCOPY;  Surgeon: Fior Shepard MD;  Location: [address];  Service: Pulmonary;  Laterality: N/A;    XI ROBOTIC RATS, WITH LYMPHADENECTOMY Left 7/8/2025    Procedure: XI ROBOTIC RATS, WITH LYMPHADENECTOMY- ZACHERY;  Surgeon: Vickey Chaudhari MD;  Location: [address];  Service: Cardiothoracic;  Laterality: Left;    XI ROBOTIC RATS,WITH LOBECTOMY,LUNG Left 7/8/2025    Procedure: XI ROBOTIC RATS,WITH SEGMENTECTOMY, LUNG- ZACHERY- APICOPOSTERIOR;  Surgeon: Vickey Chaudhari MD;  Location: [address];  Service: Cardiothoracic;  Laterality: Left;       Time Tracking:     PT Received On: 07/09/25  PT Start Time: 1341     PT Stop Time: 1405  PT Total Time (min): 24 min     Billable Minutes: Evaluation 10 and Manual therapy 14 min      07/09/2025

## 2025-07-09 NOTE — PLAN OF CARE
[facility] [facility]  Initial Discharge Assessment       Primary Care Provider: Nicky Mckenna MD    Admission Diagnosis: Lung mass [R91.8]  Pulmonary nodule [R91.1]    Admission Date: 7/8/2025  Expected Discharge Date:     Transition of Care Barriers: (P) None    Payor: BLUE CROSS BLUE SHIELD / Plan: [employer] [place] HMO / Product Type: HMO /     Extended Emergency Contact Information  Primary Emergency Contact: Jame Mario   [place]  Mobile Phone: [phone]  Relation: Spouse    Discharge Plan A: (P) Home with family  Discharge Plan B: (P) Home      [id number] [place] - [address]  [address]  [place] [address]  Phone: [phone] Fax: [phone]      Initial Assessment (most recent)       Adult Discharge Assessment - 07/09/25 1229          Discharge Assessment    Assessment Type Discharge Planning Assessment (P)      Confirmed/corrected address, phone number and insurance Yes (P)      Confirmed Demographics Correct on Facesheet (P)      Source of Information patient (P)      Communicated BHAVESH with patient/caregiver Date not available/Unable to determine (P)      People in Home spouse (P)      Name(s) of People in Home SpouseJame (c) [phone] (P)      Do you expect to return to your current living situation? Yes (P)      Do you have help at home or someone to help you manage your care at home? Yes (P)      Who are your caregiver(s) and their phone number(s)? Spouse (P)      Prior to hospitilization cognitive status: Alert/Oriented (P)      Current cognitive status: Alert/Oriented (P)      Walking or Climbing Stairs Difficulty no (P)      Dressing/Bathing Difficulty no (P)      Home Accessibility stairs within home (P)      Home Layout Able to live on 1st floor (P)      Equipment Currently Used at Home none (P)      Readmission within 30 days? No (P)      Patient currently being followed by outpatient case management? No (P)      Do you  currently have service(s) that help you manage your care at home? No (P)      Do you take prescription medications? Yes (P)      Do you have prescription coverage? Yes (P)      Do you have any problems affording any of your prescribed medications? No (P)      Is the patient taking medications as prescribed? yes (P)      Who is going to help you get home at discharge? Spouse (P)      How do you get to doctors appointments? car, drives self (P)      Are you on dialysis? No (P)      Do you take coumadin? No (P)      Discharge Plan A Home with family (P)      Discharge Plan B Home (P)      DME Needed Upon Discharge  other (see comments) (P)    TBD.    Discharge Plan discussed with: Patient (P)      Transition of Care Barriers None (P)         Physical Activity    On average, how many days per week do you engage in moderate to strenuous exercise (like a brisk walk)? 0 days (P)      On average, how many minutes do you engage in exercise at this level? 0 min (P)         Financial Resource Strain    How hard is it for you to pay for the very basics like food, housing, medical care, and heating? Not hard at all (P)         Housing Stability    In the last 12 months, was there a time when you were not able to pay the mortgage or rent on time? No (P)      At any time in the past 12 months, were you homeless or living in a shelter (including now)? No (P)         Transportation Needs    In the past 12 months, has lack of transportation kept you from medical appointments or from getting medications? No (P)      In the past 12 months, has lack of transportation kept you from meetings, work, or from getting things needed for daily living? No (P)         Food Insecurity    Within the past 12 months, you worried that your food would run out before you got the money to buy more. Never true (P)      Within the past 12 months, the food you bought just didn't last and you didn't have money to get more. Never true (P)         Stress    Do  you feel stress - tense, restless, nervous, or anxious, or unable to sleep at night because your mind is troubled all the time - these days? Not at all (P)         Social Isolation    How often do you feel lonely or isolated from those around you?  Never (P)         Alcohol Use    Q1: How often do you have a drink containing alcohol? Never (P)      Q2: How many drinks containing alcohol do you have on a typical day when you are drinking? Patient does not drink (P)      Q3: How often do you have six or more drinks on one occasion? Never (P)         Utilities    In the past 12 months has the electric, gas, oil, or water company threatened to shut off services in your home? No (P)         Health Literacy    How often do you need to have someone help you when you read instructions, pamphlets, or other written material from your doctor or pharmacy? Never (P)                    SW met with pt. at bedside to complete Initial Discharge Planning Assessment. Pt. and spouse, Jame Mario live in their 2-story home in [place], [place]. Accommodations on the 1st floor of the home. No HME, Home Health, Dialysis or Coumadin. Pt. drives, but spouse will provide transportation home upon discharge. SW noting pt has c/o headache, nurse notified.     Discharge Plan A and Plan B have been determined by review of patient's clinical status, future medical and therapeutic needs, and coverage/benefits for post-acute care in coordination with multidisciplinary team members.     Kay Feliz, LAURA

## 2025-07-09 NOTE — SUBJECTIVE & OBJECTIVE
Interval History: Naeon. Appropriate sats on RA. Chest tube without air leak. Pain well controlled.     Medications:  Continuous Infusions:  Scheduled Meds:   acetaminophen  650 mg Oral Q8H    albuterol-ipratropium  3 mL Nebulization Q6H WAKE    cetirizine  10 mg Oral Daily    enoxparin  40 mg Subcutaneous Q24H (prophylaxis, 1700)    gabapentin  300 mg Oral QHS    methocarbamoL  500 mg Oral QID    polyethylene glycol  17 g Oral Daily    raloxifene  60 mg Oral QHS    senna-docusate  1 tablet Oral BID     PRN Meds:  Current Facility-Administered Medications:     bisacodyL, 10 mg, Rectal, Daily PRN    ondansetron, 8 mg, Oral, Q8H PRN    oxyCODONE, 5 mg, Oral, Q4H PRN    oxyCODONE, 10 mg, Oral, Q4H PRN     Review of patient's allergies indicates:  No Known Allergies  Objective:     Vital Signs (Most Recent):  Temp: 98.2 °F (36.8 °C) (07/09/25 0746)  Pulse: 110 (07/09/25 1106)  Resp: 20 (07/09/25 0825)  BP: 98/62 (07/09/25 0746)  SpO2: 96 % (07/09/25 0825) Vital Signs (24h Range):  Temp:  [97.5 °F (36.4 °C)-98.3 °F (36.8 °C)] 98.2 °F (36.8 °C)  Pulse:  [] 110  Resp:  [9-20] 20  SpO2:  [95 %-100 %] 96 %  BP: ()/(54-73) 98/62     Intake/Output - Last 3 Shifts         07/07 0700 07/08 0659 07/08 0700  07/09 0659 07/09 0700  07/10 0659    P.O.   118    Total Intake(mL/kg)   118 (1.9)    Urine (mL/kg/hr)  0     Stool  0     Chest Tube  160     Total Output  160     Net  -160 +118           Unmeasured Urine Occurrence  3 x     Unmeasured Stool Occurrence  0 x             SpO2: 96 %        Physical Exam  Constitutional:       General: She is not in acute distress.     Appearance: Normal appearance. She is not ill-appearing.   HENT:      Head: Normocephalic and atraumatic.   Eyes:      Extraocular Movements: Extraocular movements intact.      Conjunctiva/sclera: Conjunctivae normal.   Cardiovascular:      Rate and Rhythm: Normal rate.   Pulmonary:      Effort: Pulmonary effort is normal. No respiratory distress.       Comments: L chest tube to water seal. Tidaling. No air leak  Musculoskeletal:      Cervical back: Normal range of motion.   Skin:     General: Skin is warm and dry.   Neurological:      General: No focal deficit present.      Mental Status: She is alert and oriented to person, place, and time.            Significant Labs:  All pertinent labs from the last 24 hours have been reviewed.    Significant Diagnostics:  CXR: I have reviewed all pertinent results/findings within the past 24 hours    VTE Risk Mitigation (From admission, onward)           Ordered     enoxaparin injection 40 mg  Every 24 hours         07/08/25 1655     Place KATIE hose  Until discontinued         07/08/25 0839     Place sequential compression device  Until discontinued         07/08/25 0839

## 2025-07-10 VITALS
SYSTOLIC BLOOD PRESSURE: 116 MMHG | DIASTOLIC BLOOD PRESSURE: 59 MMHG | WEIGHT: 139.56 LBS | TEMPERATURE: 98 F | OXYGEN SATURATION: 95 % | BODY MASS INDEX: 21.15 KG/M2 | RESPIRATION RATE: 18 BRPM | HEIGHT: 68 IN | HEART RATE: 85 BPM

## 2025-07-10 DIAGNOSIS — R91.8 LUNG MASS: Primary | ICD-10-CM

## 2025-07-10 LAB
ABSOLUTE EOSINOPHIL (OHS): 0.04 K/UL
ABSOLUTE MONOCYTE (OHS): 0.95 K/UL (ref 0.3–1)
ABSOLUTE NEUTROPHIL COUNT (OHS): 6.72 K/UL (ref 1.8–7.7)
ANION GAP (OHS): 7 MMOL/L (ref 8–16)
BASOPHILS # BLD AUTO: 0.04 K/UL
BASOPHILS NFR BLD AUTO: 0.4 %
BUN SERPL-MCNC: 10 MG/DL (ref 6–20)
CALCIUM SERPL-MCNC: 8.5 MG/DL (ref 8.7–10.5)
CHLORIDE SERPL-SCNC: 107 MMOL/L (ref 95–110)
CO2 SERPL-SCNC: 27 MMOL/L (ref 23–29)
CREAT SERPL-MCNC: 0.8 MG/DL (ref 0.5–1.4)
ERYTHROCYTE [DISTWIDTH] IN BLOOD BY AUTOMATED COUNT: 13.4 % (ref 11.5–14.5)
GFR SERPLBLD CREATININE-BSD FMLA CKD-EPI: >60 ML/MIN/1.73/M2
GLUCOSE SERPL-MCNC: 87 MG/DL (ref 70–110)
HCT VFR BLD AUTO: 37.4 % (ref 37–48.5)
HGB BLD-MCNC: 12.1 GM/DL (ref 12–16)
IMM GRANULOCYTES # BLD AUTO: 0.03 K/UL (ref 0–0.04)
IMM GRANULOCYTES NFR BLD AUTO: 0.3 % (ref 0–0.5)
LYMPHOCYTES # BLD AUTO: 2.25 K/UL (ref 1–4.8)
MCH RBC QN AUTO: 29 PG (ref 27–31)
MCHC RBC AUTO-ENTMCNC: 32.4 G/DL (ref 32–36)
MCV RBC AUTO: 90 FL (ref 82–98)
NUCLEATED RBC (/100WBC) (OHS): 0 /100 WBC
OHS QRS DURATION: 84 MS
OHS QTC CALCULATION: 412 MS
PLATELET # BLD AUTO: 317 K/UL (ref 150–450)
PMV BLD AUTO: 10.9 FL (ref 9.2–12.9)
POTASSIUM SERPL-SCNC: 4.2 MMOL/L (ref 3.5–5.1)
RBC # BLD AUTO: 4.17 M/UL (ref 4–5.4)
RELATIVE EOSINOPHIL (OHS): 0.4 %
RELATIVE LYMPHOCYTE (OHS): 22.4 % (ref 18–48)
RELATIVE MONOCYTE (OHS): 9.5 % (ref 4–15)
RELATIVE NEUTROPHIL (OHS): 67 % (ref 38–73)
SODIUM SERPL-SCNC: 141 MMOL/L (ref 136–145)
WBC # BLD AUTO: 10.03 K/UL (ref 3.9–12.7)

## 2025-07-10 PROCEDURE — 82947 ASSAY GLUCOSE BLOOD QUANT: CPT | Performed by: STUDENT IN AN ORGANIZED HEALTH CARE EDUCATION/TRAINING PROGRAM

## 2025-07-10 PROCEDURE — 25000003 PHARM REV CODE 250: Performed by: STUDENT IN AN ORGANIZED HEALTH CARE EDUCATION/TRAINING PROGRAM

## 2025-07-10 PROCEDURE — 94640 AIRWAY INHALATION TREATMENT: CPT

## 2025-07-10 PROCEDURE — 25000003 PHARM REV CODE 250: Performed by: NURSE PRACTITIONER

## 2025-07-10 PROCEDURE — 85025 COMPLETE CBC W/AUTO DIFF WBC: CPT

## 2025-07-10 PROCEDURE — 25000242 PHARM REV CODE 250 ALT 637 W/ HCPCS: Performed by: PHYSICIAN ASSISTANT

## 2025-07-10 PROCEDURE — 99024 POSTOP FOLLOW-UP VISIT: CPT | Mod: ,,, | Performed by: STUDENT IN AN ORGANIZED HEALTH CARE EDUCATION/TRAINING PROGRAM

## 2025-07-10 PROCEDURE — 36415 COLL VENOUS BLD VENIPUNCTURE: CPT | Performed by: STUDENT IN AN ORGANIZED HEALTH CARE EDUCATION/TRAINING PROGRAM

## 2025-07-10 PROCEDURE — 94761 N-INVAS EAR/PLS OXIMETRY MLT: CPT

## 2025-07-10 PROCEDURE — 25000003 PHARM REV CODE 250: Performed by: PHYSICIAN ASSISTANT

## 2025-07-10 PROCEDURE — 25000003 PHARM REV CODE 250

## 2025-07-10 RX ORDER — OXYCODONE HYDROCHLORIDE 5 MG/1
5 TABLET ORAL EVERY 4 HOURS PRN
Qty: 20 TABLET | Refills: 0 | Status: SHIPPED | OUTPATIENT
Start: 2025-07-10

## 2025-07-10 RX ORDER — GABAPENTIN 100 MG/1
100 CAPSULE ORAL 3 TIMES DAILY
Qty: 42 CAPSULE | Refills: 25 | Status: SHIPPED | OUTPATIENT
Start: 2025-07-10 | End: 2026-07-10

## 2025-07-10 RX ORDER — ACETAMINOPHEN 500 MG
1000 TABLET ORAL EVERY 8 HOURS
Qty: 84 TABLET | Refills: 0 | Status: SHIPPED | OUTPATIENT
Start: 2025-07-10

## 2025-07-10 RX ORDER — METHOCARBAMOL 500 MG/1
500 TABLET, FILM COATED ORAL 4 TIMES DAILY
Qty: 40 TABLET | Refills: 0 | Status: SHIPPED | OUTPATIENT
Start: 2025-07-10 | End: 2025-07-20

## 2025-07-10 RX ADMIN — OXYCODONE HYDROCHLORIDE 10 MG: 10 TABLET ORAL at 02:07

## 2025-07-10 RX ADMIN — IPRATROPIUM BROMIDE AND ALBUTEROL SULFATE 3 ML: 2.5; .5 SOLUTION RESPIRATORY (INHALATION) at 01:07

## 2025-07-10 RX ADMIN — IPRATROPIUM BROMIDE AND ALBUTEROL SULFATE 3 ML: 2.5; .5 SOLUTION RESPIRATORY (INHALATION) at 07:07

## 2025-07-10 RX ADMIN — SENNOSIDES AND DOCUSATE SODIUM 1 TABLET: 50; 8.6 TABLET ORAL at 09:07

## 2025-07-10 RX ADMIN — ACETAMINOPHEN 650 MG: 325 TABLET ORAL at 06:07

## 2025-07-10 RX ADMIN — ACETAMINOPHEN 650 MG: 325 TABLET ORAL at 12:07

## 2025-07-10 RX ADMIN — METHOCARBAMOL 500 MG: 500 TABLET ORAL at 12:07

## 2025-07-10 RX ADMIN — MUPIROCIN: 20 OINTMENT TOPICAL at 09:07

## 2025-07-10 RX ADMIN — OXYCODONE HYDROCHLORIDE 10 MG: 10 TABLET ORAL at 07:07

## 2025-07-10 RX ADMIN — METOPROLOL TARTRATE 12.5 MG: 25 TABLET, FILM COATED ORAL at 09:07

## 2025-07-10 RX ADMIN — POLYETHYLENE GLYCOL 3350 17 G: 17 POWDER, FOR SOLUTION ORAL at 09:07

## 2025-07-10 RX ADMIN — METHOCARBAMOL 500 MG: 500 TABLET ORAL at 09:07

## 2025-07-10 RX ADMIN — CETIRIZINE HYDROCHLORIDE 10 MG: 10 TABLET, FILM COATED ORAL at 09:07

## 2025-07-10 NOTE — NURSING
Medication picked up from [facility] per . Pt's IV  sites 18G RH, 20G RFA, removed. D/C instructions given to pt. Transport on floor to assist  pt to her vehicle.

## 2025-07-10 NOTE — SUBJECTIVE & OBJECTIVE
Interval History: Pt with sinus tachycardia overnight, started on metoprolol. Otherwise HDS. Pt complaining of increased pain at surgical site this AM, encouraged to request PRNs for breakthrough pain. Denies any other acute complaint/concern. CT tidaling without air leak.    Medications:  Continuous Infusions:  Scheduled Meds:   acetaminophen  650 mg Oral Q8H    albuterol-ipratropium  3 mL Nebulization Q6H WAKE    cetirizine  10 mg Oral Daily    enoxparin  40 mg Subcutaneous Q24H (prophylaxis, 1700)    gabapentin  300 mg Oral QHS    methocarbamoL  500 mg Oral QID    metoprolol tartrate  12.5 mg Oral BID    mupirocin   Nasal BID    polyethylene glycol  17 g Oral Daily    raloxifene  60 mg Oral QHS    senna-docusate  1 tablet Oral BID     PRN Meds:  Current Facility-Administered Medications:     bisacodyL, 10 mg, Rectal, Daily PRN    ondansetron, 8 mg, Oral, Q8H PRN    oxyCODONE, 5 mg, Oral, Q4H PRN    oxyCODONE, 10 mg, Oral, Q4H PRN     Review of patient's allergies indicates:  No Known Allergies  Objective:     Vital Signs (Most Recent):  Temp: 98 °F (36.7 °C) (07/10/25 0308)  Pulse: 95 (07/10/25 0733)  Resp: 18 (07/10/25 0733)  BP: 123/77 (07/10/25 0308)  SpO2: (!) 93 % (07/10/25 0308) Vital Signs (24h Range):  Temp:  [97 °F (36.1 °C)-99 °F (37.2 °C)] 98 °F (36.7 °C)  Pulse:  [] 95  Resp:  [15-20] 18  SpO2:  [92 %-97 %] 93 %  BP: (106-125)/(53-77) 123/77     Weight: 63.3 kg (139 lb 8.8 oz)  Body mass index is 21.22 kg/m².    Intake/Output - Last 3 Shifts         07/08 0700  07/09 0659 07/09 0700  07/10 0659 07/10 0700 07/11 0659    P.O.  118     Total Intake(mL/kg)  118 (1.9)     Urine (mL/kg/hr) 0      Stool 0      Chest Tube 160 180     Total Output 160 180     Net -160 -62            Unmeasured Urine Occurrence 3 x      Unmeasured Stool Occurrence 0 x 0 x              Physical Exam  Constitutional:       Appearance: Normal appearance.   HENT:      Head: Normocephalic and atraumatic.   Eyes:       Extraocular Movements: Extraocular movements intact.   Pulmonary:      Effort: Pulmonary effort is normal. No respiratory distress.      Breath sounds: No wheezing.   Chest:      Comments: L CT w/thin, serosanguinous output  Skin:     General: Skin is warm and dry.   Neurological:      General: No focal deficit present.      Mental Status: She is alert and oriented to person, place, and time. Mental status is at baseline.          Significant Labs:  I have reviewed all pertinent lab results within the past 24 hours.    Significant Diagnostics:  I have reviewed all pertinent imaging results/findings within the past 24 hours.

## 2025-07-10 NOTE — HOSPITAL COURSE
Please see the preoperative H&P and other available documentation for full details related to history prior to this admission.  Briefly, Radha Mario is a 59 y.o. female who was admitted following scheduled elective surgery for Lung mass. They underwent the following procedures: L RATS ZACHERY segmentectomy    Following a complete preoperative discussion of the risks and benefits of surgery with signed informed consent, the patient was taken to the operating room on 7/8/2025 and underwent the above stated procedures. The patient tolerated surgery well  and there were no complications. Please see the operative report for full intraoperative findings and details. Postoperatively, the patient did well  and was transferred from the PACU to the  floor in stable condition where they had a stable and uncomplicated  hospital course. Labs and vital signs remained stable and appropriate throughout course. Diet was advanced as tolerated and the patient's pain was controlled on oral pain medications without problem. Ambulating without issue. Voiding without issue with adequate urine output. Incision sites are clean, dry, and intact. Chest tube was removed on POD2.    Currently, the patient is doing well at 2 Days Post-Op and is stable and appropriate for discharge home at this time. Patient will follow up in clinic with Dr Chaudhari in 2 weeks.

## 2025-07-10 NOTE — PLAN OF CARE
[facility]  Discharge Final Note    Primary Care Provider: Nicky Mckenna MD    Expected Discharge Date: 7/10/2025    Final Discharge Note (most recent)       Final Note - 07/10/25 1642          Final Note    Assessment Type Final Discharge Note (P)      Anticipated Discharge Disposition Home or Self Care (P)      What phone number can be called within the next 1-3 days to see how you are doing after discharge? [phone] (P)      Hospital Resources/Appts/Education Provided Provided patient/caregiver with written discharge plan information;Provided education on problems/symptoms using teachback;Appointments scheduled and added to AVS (P)         Post-Acute Status    Post-Acute Authorization Other (P)      Other Status No Post-Acute Service Needs (P)      Discharge Delays None known at this time (P)                      Important Message from Medicare             Contact Info       Vickey Chaudhari MD   Specialty: Cardiothoracic Surgery, Thoracic Surgery    [address] [facility] [address]   Phone: [phone]       Next Steps: Follow up in 2 week(s)    Instructions: post-op visit          Pt. discharged home with Self-Care. Upon review of pt's medical record, no discharge needs identified at this time. Follow-Up appts. scheduled.     Kay Feliz LMSW

## 2025-07-10 NOTE — PLAN OF CARE
Problem: Adult Inpatient Plan of Care  Goal: Plan of Care Review  Outcome: Ongoing  Goal: Patient-Specific Goal (Individualized)  Outcome: Ongoing  Goal: Absence of Hospital-Acquired Illness or Injury  Outcome: Ongoing  Goal: Optimal Comfort and Wellbeing  Outcome: Ongoing  Goal: Readiness for Transition of Care  Outcome: Ongoing     Problem: Wound  Goal: Optimal Coping  Outcome: Ongoing  Goal: Optimal Functional Ability  Outcome: Ongoing  Goal: Absence of Infection Signs and Symptoms  Outcome: Ongoing  Goal: Improved Oral Intake  Outcome: Ongoing  Goal: Optimal Pain Control and Function  Outcome: Ongoing  Goal: Skin Health and Integrity  Outcome: Ongoing  Goal: Optimal Wound Healing  Outcome: Ongoing     Problem: Fall Injury Risk  Goal: Absence of Fall and Fall-Related Injury  Outcome: Ongoing     Problem: Pain Acute  Goal: Optimal Pain Control and Function  Outcome: Ongoing

## 2025-07-10 NOTE — DISCHARGE INSTRUCTIONS
No lifting anything heavier than 10lbs until follow-up  No strenuous exercise. Ok to walk  Ok to shower 24 hours after surgery. No tubs, lakes, pools or submerging the incisions for 2 weeks.   Ok to take scheduled tylenol and advil for pain.  Use stronger pain medications as needed.  When using narcotic pain medications, it is recommended to use daily miralax or stool softener to avoid constipation and straining.   Can buy Lidocaine patches from [employer] for additional pain control  Follow-up in clinic in 2 weeks.

## 2025-07-10 NOTE — DISCHARGE SUMMARY
[facility]  Thoracic Surgery  Discharge Summary    Patient Name: Radha Mario  MRN: [id number]  Admission Date: 7/8/2025  Hospital Length of Stay: 2 days  Discharge Date and Time: 07/10/2025 1:37 PM  Attending Physician: Vickey Chaudhari MD   Discharging Provider: Lu Narvaez MD  Primary Care Provider: Nicky Mckenna MD    HPI:   No notes on file    Procedure(s) (LRB):  XI ROBOTIC RATS, WITH LYMPHADENECTOMY- ZACHERY (Left)  XI ROBOTIC RATS,WITH SEGMENTECTOMY, LUNG- ZACHERY- APICOPOSTERIOR (Left)  BLOCK, NERVE, INTERCOSTAL, 2 OR MORE (Left)      Hospital Course: Please see the preoperative H&P and other available documentation for full details related to history prior to this admission.  Briefly, Radha Mario is a 59 y.o. female who was admitted following scheduled elective surgery for Lung mass. They underwent the following procedures: L RATS ZACHERY segmentectomy    Following a complete preoperative discussion of the risks and benefits of surgery with signed informed consent, the patient was taken to the operating room on 7/8/2025 and underwent the above stated procedures. The patient tolerated surgery well  and there were no complications. Please see the operative report for full intraoperative findings and details. Postoperatively, the patient did well  and was transferred from the PACU to the  floor in stable condition where they had a stable and uncomplicated  hospital course. Labs and vital signs remained stable and appropriate throughout course. Diet was advanced as tolerated and the patient's pain was controlled on oral pain medications without problem. Ambulating without issue. Voiding without issue with adequate urine output. Incision sites are clean, dry, and intact. Chest tube was removed on POD2.    Currently, the patient is doing well at 2 Days Post-Op and is stable and appropriate for discharge home at this time. Patient will follow up in clinic with Dr Chaudhari in 2 weeks.     Goals  of Care Treatment Preferences:  Code Status: Full Code          Significant Diagnostic Studies: N/A    Pending Diagnostic Studies:       None          Final Active Diagnoses:    Diagnosis Date Noted POA    PRINCIPAL PROBLEM:  Lung mass [R91.8] 05/12/2025 Yes      Problems Resolved During this Admission:      Discharged Condition: good    Disposition: Home or Self Care    Follow Up:   Follow-up Information       Vickey Chaudhari MD Follow up in 2 week(s).    Specialties: Cardiothoracic Surgery, Thoracic Surgery  Why: post-op visit  Contact information:  [address]  [facility] [id number] [phone]                           Patient Instructions:      Diet Adult Regular     Notify your health care provider if you experience any of the following:  temperature >100.4     Notify your health care provider if you experience any of the following:  persistent nausea and vomiting or diarrhea     Notify your health care provider if you experience any of the following:  severe uncontrolled pain     Notify your health care provider if you experience any of the following:  redness, tenderness, or signs of infection (pain, swelling, redness, odor or green/yellow discharge around incision site)     Notify your health care provider if you experience any of the following:  difficulty breathing or increased cough     Activity as tolerated     Medications:  Reconciled Home Medications:      Medication List        START taking these medications      acetaminophen 500 MG tablet  Commonly known as: TYLENOL  Take 2 tablets (1,000 mg total) by mouth every 8 (eight) hours.     gabapentin 100 MG capsule  Commonly known as: NEURONTIN  Take 1 capsule (100 mg total) by mouth 3 (three) times daily.     methocarbamoL 500 MG Tab  Commonly known as: Robaxin  Take 1 tablet (500 mg total) by mouth 4 (four) times daily. for 10 days     oxyCODONE 5 MG immediate release tablet  Commonly known as: ROXICODONE  Take 1 tablet (5 mg total) by mouth  every 4 (four) hours as needed for Pain.            CONTINUE taking these medications      azelastine 137 mcg (0.1 %) nasal spray  Commonly known as: ASTELIN  SPRAY 1 SPRAY BY NASAL ROUTE TWICE DAILY     buPROPion 150 MG TB24 tablet  Commonly known as: WELLBUTRIN XL  Take 1 tablet (150 mg total) by mouth once daily.     dicyclomine 20 mg tablet  Commonly known as: BENTYL  1 bid prn abdominal spasms     ibandronate 150 mg tablet  Commonly known as: BONIVA  TAKE 1 TABLET BY MOUTH EVERY 30 DAYS.     ibuprofen 600 MG tablet  Commonly known as: ADVIL,MOTRIN  Take 1 tablet (600 mg total) by mouth 3 (three) times daily.     raloxifene 60 mg tablet  Commonly known as: EVISTA  TAKE 1 TABLET BY MOUTH EVERY DAY     valACYclovir 500 MG tablet  Commonly known as: VALTREX  TAKE 1 TABLET (500 MG TOTAL) BY MOUTH 2 (TWO) TIMES DAILY. FOR 3 DAYS AS NEEDED FOR COLD SORES.     VITAMIN D ORAL  Take by mouth.            ASK your doctor about these medications      magnesium 250 mg Tab  Take by mouth once.     rosuvastatin 5 MG tablet  Commonly known as: CRESTOR  Take 1 tablet (5 mg total) by mouth once daily.              Lu Narvaez MD  Thoracic Surgery  [facility]

## 2025-07-10 NOTE — NURSING
"[facility] Plan of Care Note    Dx: Lung mass [R91.8]  Pulmonary nodule [R91.1]    Shift Events: Pt CT removed @ 0900 Pt has lap sites on her back CD&I derma bond noted.       Goals of Care: promote healing    Neuro: A&OX4    Vital Signs: BP (!) 116/59 (BP Location: Left arm, Patient Position: Lying)   Pulse 85   Temp 98.2 °F (36.8 °C) (Oral)   Resp 18   Ht 5' 8" (1.727 m)   Wt 63.3 kg (139 lb 8.8 oz)   LMP  (LMP Unknown)   SpO2 95%   Breastfeeding No   BMI 21.22 kg/m²     Respiratory: WNL    Diet: Diet Adult Regular,Diet Adult Regular      Is patient tolerating current diet? yes    GTTS: N/A    Urine Output/Bowel Movement:     Intake/Output Summary (Last 24 hours) at 7/10/2025 1531  Last data filed at 7/10/2025 0830  Gross per 24 hour   Intake --   Output 180 ml   Net -180 ml          Drains/Tubes/Tube Feeds (include total output/shift):   Output by Drain (mL) 07/08/25 0701 - 07/08/25 1900 07/08/25 1901 - 07/09/25 0700 07/09/25 0701 - 07/09/25 1900 07/09/25 1901 - 07/10/25 0700 07/10/25 0701 - 07/10/25 1531   Patient has no LDAs of requested type attached.          Lines: See LDA      Accuchecks:no    Skin: See LDA    Fall Risk Score: see flowsheet    Activity level? See flowsheet    Any scheduled procedures? none    Any safety concerns? none    Other: n/a   "

## 2025-07-11 NOTE — ANESTHESIA POSTPROCEDURE EVALUATION
Anesthesia Post Evaluation    Patient: Radha Francisco Lusco    Procedure(s) Performed: Procedure(s) (LRB):  XI ROBOTIC RATS, WITH LYMPHADENECTOMY- ZACHERY (Left)  XI ROBOTIC RATS,WITH SEGMENTECTOMY, LUNG- ZACHERY- APICOPOSTERIOR (Left)  BLOCK, NERVE, INTERCOSTAL, 2 OR MORE (Left)    Final Anesthesia Type: general      Patient location during evaluation: PACU  Patient participation: Yes- Able to Participate  Level of consciousness: awake and alert  Post-procedure vital signs: reviewed and stable  Pain management: adequate  Airway patency: patent    PONV status at discharge: No PONV  Anesthetic complications: no      Cardiovascular status: blood pressure returned to baseline  Respiratory status: unassisted  Follow-up not needed.                Event Time   Out of Recovery 15:45:00         Pain/Christiano Score: Pain Rating Prior to Med Admin: 6 (7/10/2025 12:45 PM)

## 2025-07-13 ENCOUNTER — PATIENT MESSAGE (OUTPATIENT)
Dept: CARDIOTHORACIC SURGERY | Facility: CLINIC | Age: 60
End: 2025-07-13
Payer: COMMERCIAL

## 2025-07-14 NOTE — OP NOTE
DATE OF PROCEDURE: 7/8/2025     PREOPERATIVE DIAGNOSES:   Lung mass [R91.8]    POSTOPERATIVE DIAGNOSES:   Lung mass [R91.8]    PROCEDURES PERFORMED:   Procedure(s) (LRB):  XI ROBOTIC RATS, WITH LYMPHADENECTOMY- ZACHERY (Left)  XI ROBOTIC RATS,WITH SEGMENTECTOMY, LUNG- ZACHERY- APICOPOSTERIOR (Left)  BLOCK, NERVE, INTERCOSTAL, 2 OR MORE (Left)    Surgeons and Role:     * Vickey Chaudhari MD - Primary     * Laverne Painter PA-C - Assisting     * Petar Hassan MD - Assisting     * Rossi Jorgensen MD - Fellow        ANESTHESIA: General    INDICATIONS FOR PROCEDURE: Patient is a 59 y.o. female former smoker with emphysema, h/o left breast cancer (2001, s/p lumpectomy and RT) and high grade squamous intraepithelial lesion presents to clinic for evaluation of ZACHERY pulmonary nodule. Biopsy unable to obtain results despite high suspicion. We decided to proceed with operative resection.     DESCRIPTION OF PROCEDURE:   Thoracic (Pulmonary) Cancer - Synoptic Operative Report Summary    Side of surgery Left   Surgical approach Robotic   Tumor type Other - final diagnosis pending   Which lymph nodes were submitted as separate specimens? 5 - Subaortic, 6 - Para-aortic (ascending aorta or phrenic), 7 - Subcarinal, 9L - Pulmonary ligament (left), 10L - Hilar (left), 11L - Interlobar (left), 12L - Lobar (left), and 13L - Segmental (left)   What pre-operative therapies did the patient receive? None         The patient was identified in the preoperative area. Procedure was confirmed. Patient was transferred to the OR and general anesthesia was induced. The patient was intubated with a double lumen endotracheal tube. Lines for monitoring and access were placed.     We started with the bronchoscopy. The ETT was in good position. No invasion was seen. Minimal secretions were seen. No anatomic abnormalities were found.     We placed the patient in the lateral decubitus position and prepped and draped in the usual fashion. Timeout was  performed. Access was gained in the 8th interspace in line with the anterior axilla with an 8 mm port. The chest was surveyed. No injuries or gross abnormalities were found other than the mass. No other masses were found. Adhesions were minimal. There was no unexpected invasion. There was good lung isolation. Two more 8 mm ports were placed posteriorly at least a hand breadth apart in line within the same interspace. One 12 mm port was placed anteriorly in the same interspace, a hand breadth away from the original port. A 12 mm Air Seal trocar was placed anteriorly, triangulated inferiorly with the two anterior ports. All of these were placed under direct visualization. The robot was docked. Two rolled pieces of radiopaque gauze were placed in the chest for retraction.     As there was no other qualified resident or assistant, the PA performed the first assist and robotic bedside procedures.    The lower lobe was retracted superiorly. The inferior pulmonary ligament was dissected. Level 9 nodes were dissected and passed off for pathology. We dissected the posterior hilum. Level 7 nodes were dissected and passed off for pathology. The esophagus and vagus nerves were identified and protected. Level 10 and 11 nodes were also encountered, dissected and passed off as we dissected distally into the hilum along the bronchus and posterior PA. The superior hilum was dissected. Level 5 and 6 nodes were encountered, dissected, and passed off for pathology. The recurrent laryngeal nerve was identified and protected. The anterior hilum was dissected. The superior pulmonary vein was dissected. A lingular vein was identified. The phrenic nerve was identified and avoided. We turned our attention to the fissure. The pulmonary artery was dissected anteriorly and posteriorly. The superior segmental branch and lingular branches were identified. The fissure was dissected further to join with the posterior hilar dissection, then the  fissure was completed with multiple staple loads, leaving the lingula PA and bronchus and protecting the PA branch to the superior segment and lower lobe bronchus. More level 11, 12, and 13 nodes were dissected and passed off. The posterior ascending artery was identified, dissected, and divided. Other apical and posterior branches were identified, dissected, and divided. The bronchi to the apical and posterior segments were dissected. The anterior bronchus was identified. The apical and posterior branches were clamped, and the anterior and lingular segments as well as the lower lobe were aerated to confirm patency. Once confirmed, the apical and posterior segments were divided with the stapler. The apical and posterior veins were identified in the intersegmental plane and divided with the stapler. ICG was used to evaluate lack of perfusion to the specimen. This line was marked with electrocautery, and the apicoposterior segment parenchyma was divided with multiple robot staple loads to free the specimen completely. The specimen was placed in a bag and passed off for pathology. The mass was palpated in the specimen with a good margin.    We did a six level intercostal block under direct visualization. We confirmed no leak from the bronchus and confirmed hemostasis at the hilum and the robot port sites. The robot was undocked. A single chest tube was placed in the chest. The ports were removed under direct visualization. Hemostasis was again confirmed. The skin incisions were closed in layers, and the chest tube was secured and connected to a Pleur-evac. This completed the procedure. All counts were correct x2. The patient was extubated and transferred to PACU in stable condition.    ESTIMATED BLOOD LOSS: 50 ml    SPECIMENS:   ID Type Source Tests Collected by Time Destination   1 : Left Level 9 Tissue Lymph Node(s), Mediastinal, Left SPECIMEN TO PATHOLOGY Vickey Chaudhari MD 7/8/2025 1207     2 : Level 7 Tissue  Lymph Node(s), Mediastinal, Left SPECIMEN TO PATHOLOGY Vickey Chaudhari MD 7/8/2025 1201     3 : Left Level 11 Tissue Lymph Node(s), Mediastinal, Left SPECIMEN TO PATHOLOGY Vickey Chaudhari MD 7/8/2025 1201     4 : Left Level 12 Tissue Lymph Node(s), Mediastinal, Left SPECIMEN TO PATHOLOGY Vickey Chaudhari MD 7/8/2025 1201     5 : Left Level 13 Tissue Lymph Node(s), Mediastinal, Left SPECIMEN TO PATHOLOGY Vickey Chaudhari MD 7/8/2025 1237     6 : Left Level 10 Tissue Lymph Node(s), Mediastinal, Left SPECIMEN TO PATHOLOGY Vickey Chaudhari MD 7/8/2025 1353     7 : Left Level 6 Tissue Lymph Node(s), Mediastinal, Left SPECIMEN TO PATHOLOGY Vickey Chaudhari MD 7/8/2025 1400     8 : Left Level 5 Tissue Lymph Node(s), Mediastinal, Left SPECIMEN TO PATHOLOGY Vickey Chaudhari MD 7/8/2025 1400     9 : Left Upper Apicoposterior Segmentectomy Tissue Lung, Left Upper Lobe SPECIMEN TO PATHOLOGY Vickey Chaudhari MD 7/8/2025 1402           COMPLICATIONS: No

## 2025-07-16 LAB
DHEA SERPL-MCNC: ABNORMAL
ESTROGEN SERPL-MCNC: ABNORMAL PG/ML
INSULIN SERPL-ACNC: ABNORMAL U[IU]/ML
LAB AP CLINICAL INFORMATION: ABNORMAL
LAB AP DIAGNOSIS CATEGORY: ABNORMAL
LAB AP GROSS DESCRIPTION: ABNORMAL
LAB AP PERFORMING LOCATION(S): ABNORMAL
LAB AP REPORT FOOTNOTES: ABNORMAL
LAB AP SYNOPTIC CHECKLIST: ABNORMAL

## 2025-07-18 ENCOUNTER — PATIENT MESSAGE (OUTPATIENT)
Dept: HEMATOLOGY/ONCOLOGY | Facility: CLINIC | Age: 60
End: 2025-07-18
Payer: COMMERCIAL

## 2025-07-18 DIAGNOSIS — D06.9 HIGH GRADE SQUAMOUS INTRAEPITHELIAL LESION (HGSIL), GRADE 3 CIN, ON BIOPSY OF CERVIX: Primary | ICD-10-CM

## 2025-07-18 NOTE — PROGRESS NOTES
"Subjective     Patient ID: Radha Mario is a 59 y.o. female.    Chief Complaint: Post-op Evaluation    Diagnosis:  NSCLC    Pre-operative therapy: none     Procedure(s) and date(s): 7/8/25: left robotic assisted apicoposterior segmentectomy with MLND    Pathology: 2.0 cm moderately differentiated adenocarcinoma. 0.6 cm parenchymal margin. Levels 5,6,7,9,11,12,13 = negative. pT1bN0     Post-operative therapy: surveillance     HPI  Patient is a 59 y.o. female former smoker with emphysema, h/o left breast cancer (2001, s/p lumpectomy and RT) and high grade squamous intraepithelial lesion presents to clinic for post op follow-up from Left robotic assisted segmentectomy with MLND. Uncomplicated post op course.     Since discharge, she has continued to improve. Pain is controlled as she has only taken one oxycodone. She has some numbness anterior to the main incision. Breathing is improving as well, and she has no problem completing any of her normal tasks.     We discussed the pathology report, stage, prognosis, treatment options, and surveillance for the diagnosis. All questions were answered. She has a pathologic stage IA with negative margins and no concerning findings increasing risk of recurrence. She will not need further treatment, and she will undergo surveillance. As she had not had a definite diagnosis preop, this is the first true discussion of cancer that we had.     CXR 7/21/25:  Shows some L diaphragm elevation and enlarged gastric or colon bubble.   She has no nausea. She has no shortness of breathe.       Review of Systems  Negative 14 pt ros except as above     Objective     Vitals:    07/21/25 1301   BP: 102/60   Pulse: 95   SpO2: 97%   Weight: 63.3 kg (139 lb 8.8 oz)   Height: 5' 8" (1.727 m)   PainSc: 0-No pain         Physical Exam    General Appearance:    Alert, cooperative, no distress, appears stated age   Head:    Normocephalic, without obvious abnormality, atraumatic   Eyes:    PERRL, " conjunctiva/corneas clear, EOM's intact   Ears:    Normal external ear canals, both ears   Nose:   Nares normal, no drainage   Throat:   Lips normal, voice normal   Neck:   Supple, symmetrical, trachea midline, no JVD   Back:     Symmetric, ROM normal   Lungs:     Clear to auscultation bilaterally, respirations unlabored   Chest wall:    No tenderness or deformity    Incisions clean, dry, intact without signs of infection   Heart:    Regular rate and rhythm, warm and well perfused   Abdomen:     Soft, nondistended   Extremities:   Extremities normal, atraumatic, no cyanosis or edema   Pulses:   2+ and symmetric all extremities   Skin:   Skin color, texture, turgor normal, no rashes or lesions   Neurologic:   CNII-XII intact. Normal gross strength, sensation throughout               Assessment and Plan     Patient is a 59 y.o. female former smoker with emphysema, h/o left breast cancer (2001, s/p lumpectomy and RT) and high grade squamous intraepithelial lesion presents to clinic for post op follow-up from Left robotic assisted segmentectomy with MLND for stage IA pT1bN0.   - no further treatment necessary   - enter surveillance - f/u CT scan of chest in 6 months  - re-eval left diaphragm elevation on CT scan at that time in addition to any signs of phrenic injury

## 2025-07-21 ENCOUNTER — HOSPITAL ENCOUNTER (OUTPATIENT)
Dept: RADIOLOGY | Facility: HOSPITAL | Age: 60
Discharge: HOME | End: 2025-07-21
Attending: STUDENT IN AN ORGANIZED HEALTH CARE EDUCATION/TRAINING PROGRAM
Payer: COMMERCIAL

## 2025-07-21 ENCOUNTER — OFFICE VISIT (OUTPATIENT)
Dept: CARDIOTHORACIC SURGERY | Facility: CLINIC | Age: 60
End: 2025-07-21
Attending: STUDENT IN AN ORGANIZED HEALTH CARE EDUCATION/TRAINING PROGRAM
Payer: COMMERCIAL

## 2025-07-21 VITALS
OXYGEN SATURATION: 97 % | DIASTOLIC BLOOD PRESSURE: 60 MMHG | WEIGHT: 139.56 LBS | HEART RATE: 95 BPM | BODY MASS INDEX: 21.15 KG/M2 | HEIGHT: 68 IN | SYSTOLIC BLOOD PRESSURE: 102 MMHG

## 2025-07-21 DIAGNOSIS — R91.8 LUNG MASS: ICD-10-CM

## 2025-07-21 DIAGNOSIS — R91.8 LUNG MASS: Primary | ICD-10-CM

## 2025-07-21 PROCEDURE — 99999 PR PBB SHADOW E&M-EST. PATIENT-LVL IV: CPT | Mod: PBBFAC,,, | Performed by: STUDENT IN AN ORGANIZED HEALTH CARE EDUCATION/TRAINING PROGRAM

## 2025-07-21 PROCEDURE — 71046 X-RAY EXAM CHEST 2 VIEWS: CPT | Mod: 26,,, | Performed by: RADIOLOGY

## 2025-07-21 PROCEDURE — 71046 X-RAY EXAM CHEST 2 VIEWS: CPT | Mod: TC

## 2025-07-23 NOTE — PHYSICIAN QUERY
Provider, do you agree with the path findings of: invasive acinar adenocarcinoma.   Pathology findings noted above are ruled in/confirmed as diagnoses

## 2025-08-18 ENCOUNTER — PATIENT MESSAGE (OUTPATIENT)
Dept: HEMATOLOGY/ONCOLOGY | Facility: CLINIC | Age: 60
End: 2025-08-18
Payer: COMMERCIAL

## (undated) DEVICE — DRAPE INCISE IOBAN 2 23X17IN

## (undated) DEVICE — SYR 10CC LUER LOCK

## (undated) DEVICE — CANNULA REDUCER 12-8MM

## (undated) DEVICE — SOL IRR SOD CHL .9% POUR

## (undated) DEVICE — HEMOSTAT SURGICEL NUKNIT 3X4IN

## (undated) DEVICE — SUT 2-0 VICRYL / CT-1

## (undated) DEVICE — KIT ANTIFOG W/SPONG & FLUID

## (undated) DEVICE — ADAPTER VISION PROBE & SUCTION

## (undated) DEVICE — PENCIL ROCKER SWITCH 10FT CORD

## (undated) DEVICE — STAPLER SUREFORM CRVD TIP 45

## (undated) DEVICE — DRAPE COLUMN DAVINCI XI

## (undated) DEVICE — SUT 1 27IN CHROMIC GUT CT-1

## (undated) DEVICE — ELECTRODE BLADE INSULATED 1 IN

## (undated) DEVICE — SUT SILK 0 BLK BR FSL 18 IN

## (undated) DEVICE — SUT SILK 0 STRANDS 30IN BLK

## (undated) DEVICE — RELOAD SUREFORM 45 4.3 GRN 6R

## (undated) DEVICE — NDL HYPO REG 25G X 1 1/2

## (undated) DEVICE — RELOAD SUREFORM 45 2.5 WHT 6R

## (undated) DEVICE — ELECTRODE MEGADYNE RETURN DUAL

## (undated) DEVICE — CONTAINER SPECIMEN OR STER 4OZ

## (undated) DEVICE — COVER LIGHT HANDLE

## (undated) DEVICE — SET TRI-LUMEN FILTERED TUBE

## (undated) DEVICE — SYR ONLY LUER LOCK 20CC

## (undated) DEVICE — SUT 0 VICRYL / CT-1

## (undated) DEVICE — NDL FLTR 5MCRN BLNT TIP 18GX1

## (undated) DEVICE — TUBING SUC UNIV W/CONN 12FT

## (undated) DEVICE — SEAL UNIVERSAL 5MM-8MM XI

## (undated) DEVICE — CONNECTOR SWIVEL

## (undated) DEVICE — SUT 2/0 30IN SILK BLK BRAI

## (undated) DEVICE — NDL SPINAL 18GX3.5 SPINOCAN

## (undated) DEVICE — PACK ECLIPSE SET-UP W/O DRAPE

## (undated) DEVICE — SOL WATER STRL IRR 1000ML

## (undated) DEVICE — SUT MCRYL PLUS 4-0 PS2 27IN

## (undated) DEVICE — PENCIL GOLF STERILE

## (undated) DEVICE — DRESSING TRANS 4X4 TEGADERM

## (undated) DEVICE — SWAB PROCTO 16 X 1 1/2 ST 2/PK

## (undated) DEVICE — DRESSING TRANS 2X2 TEGADERM

## (undated) DEVICE — SUT VICRYL 3-0 27 SH

## (undated) DEVICE — SYS LABEL CORRECT MED

## (undated) DEVICE — JELLY SURGILUBE 5GR

## (undated) DEVICE — DRAPE THREE-QTR REINF 53X77IN

## (undated) DEVICE — PENCIL SMK EVAC CONNECTOR 10FT

## (undated) DEVICE — SPONGE COTTON TRAY 4X4IN

## (undated) DEVICE — BOWL STERILE LARGE 32OZ

## (undated) DEVICE — GLOVE BIOGEL SKINSENSE PI 6.5

## (undated) DEVICE — TIP YANKAUERS BULB NO VENT

## (undated) DEVICE — DRESSING SURGICAL 1X3

## (undated) DEVICE — SEALANT VISTASEAL FIBRIN 4ML

## (undated) DEVICE — ELECTRODE REM PLYHSV RETURN 9

## (undated) DEVICE — BLADE SURG STAINLESS STEEL #11

## (undated) DEVICE — SET TUBE DAVINCI 5 HI FLOW INS

## (undated) DEVICE — PAD PREP CUFFED NS 24X48IN

## (undated) DEVICE — PORT AIRSEAL 12/120MM LPI

## (undated) DEVICE — SOL POVIDONE PREP IODINE 4 OZ

## (undated) DEVICE — SOL POVIDONE SCRUB IODINE 4 OZ

## (undated) DEVICE — SYR SLIP TIP 20CC

## (undated) DEVICE — GLOVE BIOGEL SKINSENSE PI 7.5

## (undated) DEVICE — CANNULA SEAL 12MM

## (undated) DEVICE — GOWN SMART IMP BREATHABLE XXLG

## (undated) DEVICE — DRAPE SCOPE PILLOW WARMER

## (undated) DEVICE — Device

## (undated) DEVICE — APPLICATOR VISTASEAL FLEX 45CM

## (undated) DEVICE — TRAY MINOR GEN SURG OMC

## (undated) DEVICE — BAG ION VISION PROBE

## (undated) DEVICE — PENCIL ELECTROSURG HOLST W/BLD

## (undated) DEVICE — ELECTRODE BALL RED 5MM

## (undated) DEVICE — GOWN POLY REINF BRTH SLV XL

## (undated) DEVICE — TRAY DRY SKIN SCRUB PREP

## (undated) DEVICE — DRAPE ARM DAVINCI XI

## (undated) DEVICE — TAPE SILK 3IN

## (undated) DEVICE — BAG INZII TISS RETRV 12/15MM

## (undated) DEVICE — NDL ASPIRATING VIZISHOT 20-40M

## (undated) DEVICE — ADAPTER SWIVEL

## (undated) DEVICE — SEE MEDLINE ITEM 157196

## (undated) DEVICE — DRAPE ABDOMINAL TIBURON 14X11